# Patient Record
Sex: MALE | Race: WHITE | Employment: OTHER | URBAN - METROPOLITAN AREA
[De-identification: names, ages, dates, MRNs, and addresses within clinical notes are randomized per-mention and may not be internally consistent; named-entity substitution may affect disease eponyms.]

---

## 2017-03-20 ENCOUNTER — ANESTHESIA EVENT (OUTPATIENT)
Dept: ENDOSCOPY | Age: 82
End: 2017-03-20
Payer: MEDICARE

## 2017-03-21 ENCOUNTER — ANESTHESIA (OUTPATIENT)
Dept: ENDOSCOPY | Age: 82
End: 2017-03-21
Payer: MEDICARE

## 2017-03-21 PROCEDURE — 74011250636 HC RX REV CODE- 250/636

## 2017-03-21 RX ORDER — PROPOFOL 10 MG/ML
INJECTION, EMULSION INTRAVENOUS
Status: DISCONTINUED | OUTPATIENT
Start: 2017-03-21 | End: 2017-03-21 | Stop reason: HOSPADM

## 2017-03-21 RX ORDER — PROPOFOL 10 MG/ML
INJECTION, EMULSION INTRAVENOUS AS NEEDED
Status: DISCONTINUED | OUTPATIENT
Start: 2017-03-21 | End: 2017-03-21 | Stop reason: HOSPADM

## 2017-03-21 RX ADMIN — PROPOFOL 80 MG: 10 INJECTION, EMULSION INTRAVENOUS at 10:00

## 2017-03-21 RX ADMIN — PROPOFOL 100 MCG/KG/MIN: 10 INJECTION, EMULSION INTRAVENOUS at 10:00

## 2017-03-21 NOTE — ANESTHESIA POSTPROCEDURE EVALUATION
Post-Anesthesia Evaluation and Assessment    Patient: Neftaly Simeon MRN: [id number]  SSN: [id number]    YOB: 1927  Age: 80 y.o. Sex: male       Cardiovascular Function/Vital Signs  Visit Vitals    BP 95/52    Pulse 62    Temp 36 °C (96.8 °F)    Resp 12    Ht 5' 8\" (1.727 m)    Wt 70.8 kg (156 lb)    SpO2 96%    BMI 23.72 kg/m2       Patient is status post total IV anesthesia anesthesia for Procedure(s):  ESOPHAGOGASTRODUODENOSCOPY (EGD)    BMI 26  ESOPHAGEAL DILATION  INJECTION. Nausea/Vomiting: None    Postoperative hydration reviewed and adequate. Pain:  Pain Scale 1: Numeric (0 - 10) (03/21/17 1001)  Pain Intensity 1: 0 (03/21/17 1001)   Managed    Neurological Status: At baseline    Mental Status and Level of Consciousness: Arousable    Pulmonary Status:   O2 Device: CO2 nasal cannula (03/21/17 1001)   Adequate oxygenation and airway patent    Complications related to anesthesia: None    Post-anesthesia assessment completed.  No concerns    Signed By: Marilee Holland MD     March 21, 2017

## 2017-03-21 NOTE — ANESTHESIA PREPROCEDURE EVALUATION
Anesthetic History   No history of anesthetic complications            Review of Systems / Medical History  Patient summary reviewed and pertinent labs reviewed    Pulmonary  Within defined limits                 Neuro/Psych   Within defined limits           Cardiovascular  Within defined limits                Exercise tolerance: >4 METS     GI/Hepatic/Renal     GERD: well controlled           Endo/Other             Other Findings              Physical Exam    Airway  Mallampati: II  TM Distance: 4 - 6 cm  Neck ROM: normal range of motion   Mouth opening: Normal     Cardiovascular  Regular rate and rhythm,  S1 and S2 normal,  no murmur, click, rub, or gallop  Rhythm: regular  Rate: normal         Dental    Dentition: Full lower dentures and Full upper dentures     Pulmonary  Breath sounds clear to auscultation               Abdominal  GI exam deferred       Other Findings            Anesthetic Plan    ASA: 2  Anesthesia type: total IV anesthesia          Induction: Intravenous  Anesthetic plan and risks discussed with: Patient

## 2017-07-05 ENCOUNTER — ANESTHESIA EVENT (OUTPATIENT)
Dept: ENDOSCOPY | Age: 82
End: 2017-07-05
Payer: MEDICARE

## 2017-07-05 RX ORDER — SODIUM CHLORIDE, SODIUM LACTATE, POTASSIUM CHLORIDE, CALCIUM CHLORIDE 600; 310; 30; 20 MG/100ML; MG/100ML; MG/100ML; MG/100ML
100 INJECTION, SOLUTION INTRAVENOUS CONTINUOUS
Status: CANCELLED | OUTPATIENT
Start: 2017-07-05

## 2017-07-05 RX ORDER — SODIUM CHLORIDE 0.9 % (FLUSH) 0.9 %
5-10 SYRINGE (ML) INJECTION AS NEEDED
Status: CANCELLED | OUTPATIENT
Start: 2017-07-05

## 2017-07-06 ENCOUNTER — ANESTHESIA (OUTPATIENT)
Dept: ENDOSCOPY | Age: 82
End: 2017-07-06
Payer: MEDICARE

## 2017-07-06 ENCOUNTER — HOSPITAL ENCOUNTER (OUTPATIENT)
Age: 82
Setting detail: OUTPATIENT SURGERY
Discharge: HOME | End: 2017-07-06
Attending: INTERNAL MEDICINE | Admitting: INTERNAL MEDICINE
Payer: MEDICARE

## 2017-07-06 VITALS
DIASTOLIC BLOOD PRESSURE: 69 MMHG | RESPIRATION RATE: 16 BRPM | HEART RATE: 55 BPM | BODY MASS INDEX: 24.33 KG/M2 | OXYGEN SATURATION: 95 % | TEMPERATURE: 98.6 F | WEIGHT: 155 LBS | SYSTOLIC BLOOD PRESSURE: 125 MMHG | HEIGHT: 67 IN

## 2017-07-06 PROCEDURE — 77030009426 HC FCPS BIOP ENDOSC BSC -B: Performed by: INTERNAL MEDICINE

## 2017-07-06 PROCEDURE — 74011000250 HC RX REV CODE- 250

## 2017-07-06 PROCEDURE — C1726 CATH, BAL DIL, NON-VASCULAR: HCPCS | Performed by: INTERNAL MEDICINE

## 2017-07-06 PROCEDURE — 76060000031 HC ANESTHESIA FIRST 0.5 HR: Performed by: INTERNAL MEDICINE

## 2017-07-06 PROCEDURE — 77030031722: Performed by: INTERNAL MEDICINE

## 2017-07-06 PROCEDURE — 74011250636 HC RX REV CODE- 250/636: Performed by: INTERNAL MEDICINE

## 2017-07-06 PROCEDURE — 74011250636 HC RX REV CODE- 250/636: Performed by: ANESTHESIOLOGY

## 2017-07-06 PROCEDURE — 76040000025: Performed by: INTERNAL MEDICINE

## 2017-07-06 PROCEDURE — 74011250636 HC RX REV CODE- 250/636

## 2017-07-06 RX ORDER — SODIUM CHLORIDE, SODIUM LACTATE, POTASSIUM CHLORIDE, CALCIUM CHLORIDE 600; 310; 30; 20 MG/100ML; MG/100ML; MG/100ML; MG/100ML
1000 INJECTION, SOLUTION INTRAVENOUS CONTINUOUS
Status: DISCONTINUED | OUTPATIENT
Start: 2017-07-06 | End: 2017-07-06 | Stop reason: HOSPADM

## 2017-07-06 RX ORDER — SODIUM CHLORIDE, SODIUM LACTATE, POTASSIUM CHLORIDE, CALCIUM CHLORIDE 600; 310; 30; 20 MG/100ML; MG/100ML; MG/100ML; MG/100ML
100 INJECTION, SOLUTION INTRAVENOUS CONTINUOUS
Status: DISCONTINUED | OUTPATIENT
Start: 2017-07-06 | End: 2017-07-06 | Stop reason: HOSPADM

## 2017-07-06 RX ORDER — TRIAMCINOLONE ACETONIDE 40 MG/ML
40 INJECTION, SUSPENSION INTRA-ARTICULAR; INTRAMUSCULAR
Status: COMPLETED | OUTPATIENT
Start: 2017-07-06 | End: 2017-07-06

## 2017-07-06 RX ORDER — LIDOCAINE HYDROCHLORIDE 20 MG/ML
INJECTION, SOLUTION EPIDURAL; INFILTRATION; INTRACAUDAL; PERINEURAL AS NEEDED
Status: DISCONTINUED | OUTPATIENT
Start: 2017-07-06 | End: 2017-07-06 | Stop reason: HOSPADM

## 2017-07-06 RX ORDER — PROPOFOL 10 MG/ML
INJECTION, EMULSION INTRAVENOUS AS NEEDED
Status: DISCONTINUED | OUTPATIENT
Start: 2017-07-06 | End: 2017-07-06 | Stop reason: HOSPADM

## 2017-07-06 RX ADMIN — PROPOFOL 20 MG: 10 INJECTION, EMULSION INTRAVENOUS at 09:03

## 2017-07-06 RX ADMIN — PROPOFOL 20 MG: 10 INJECTION, EMULSION INTRAVENOUS at 09:04

## 2017-07-06 RX ADMIN — PROPOFOL 20 MG: 10 INJECTION, EMULSION INTRAVENOUS at 09:07

## 2017-07-06 RX ADMIN — PROPOFOL 20 MG: 10 INJECTION, EMULSION INTRAVENOUS at 09:06

## 2017-07-06 RX ADMIN — PROPOFOL 20 MG: 10 INJECTION, EMULSION INTRAVENOUS at 09:05

## 2017-07-06 RX ADMIN — SODIUM CHLORIDE, SODIUM LACTATE, POTASSIUM CHLORIDE, AND CALCIUM CHLORIDE 100 ML/HR: 600; 310; 30; 20 INJECTION, SOLUTION INTRAVENOUS at 08:05

## 2017-07-06 RX ADMIN — TRIAMCINOLONE ACETONIDE 40 MG: 40 INJECTION, SUSPENSION INTRA-ARTICULAR; INTRAMUSCULAR at 09:13

## 2017-07-06 RX ADMIN — LIDOCAINE HYDROCHLORIDE 40 MG: 20 INJECTION, SOLUTION EPIDURAL; INFILTRATION; INTRACAUDAL; PERINEURAL at 09:03

## 2017-07-06 NOTE — ANESTHESIA POSTPROCEDURE EVALUATION
Post-Anesthesia Evaluation and Assessment    Patient: Nathalie Daniels MRN: [id number]  SSN: [id number]    YOB: 1927  Age: 80 y.o. Sex: male       Cardiovascular Function/Vital Signs  Visit Vitals    /69    Pulse (!) 55    Temp 37 °C (98.6 °F)    Resp 16    Ht 5' 7\" (1.702 m)    Wt 70.3 kg (155 lb)    SpO2 95%    BMI 24.28 kg/m2       Patient is status post total IV anesthesia anesthesia for Procedure(s):  ESOPHAGOGASTRODUODENOSCOPY (EGD) WITH DILATION WITH KENALOG/ BMI=25  ESOPHAGEAL DILATION  INJECTION. Nausea/Vomiting: None    Postoperative hydration reviewed and adequate. Pain:  Pain Scale 1: Numeric (0 - 10) (07/06/17 0936)  Pain Intensity 1: 0 (07/06/17 0936)   Managed    Neurological Status: At baseline    Mental Status and Level of Consciousness: Arousable    Pulmonary Status:   O2 Device: Room air (07/06/17 0936)   Adequate oxygenation and airway patent    Complications related to anesthesia: None    Post-anesthesia assessment completed.  No concerns    Signed By: Lior Watts MD     July 6, 2017

## 2017-07-06 NOTE — ANESTHESIA PREPROCEDURE EVALUATION
Anesthetic History               Review of Systems / Medical History  Patient summary reviewed, nursing notes reviewed and pertinent labs reviewed    Pulmonary                   Neuro/Psych             Comments: [place] [facility]                  Exercise tolerance: >4 METS     GI/Hepatic/Renal     GERD: well controlled          Comments: Esophageal stenosis Endo/Other             Other Findings            Physical Exam    Airway  Mallampati: II  TM Distance: > 6 cm  Neck ROM: decreased range of motion   Mouth opening: Normal     Cardiovascular  Regular rate and rhythm,  S1 and S2 normal,  no murmur, click, rub, or gallop             Dental    Dentition: Lower partial plate and Bridges     Pulmonary        Wheezes (Bilaterally.   Mild)         Abdominal  GI exam deferred       Other Findings            Anesthetic Plan    ASA: 3  Anesthesia type: total IV anesthesia            Anesthetic plan and risks discussed with: Patient    Grandson present

## 2017-07-06 NOTE — IP AVS SNAPSHOT
[address] 
[phone] Patient: Boston Corrales MRN: [id number] :1927 You are allergic to the following Allergen Reactions Chocolate Flavor (Flavoring Agent) Rash Recent Documentation Height Weight BMI Smoking Status 1.702 m 70.3 kg 24.28 kg/m2 Never Smoker Emergency Contacts Name Discharge Info Relation Home Work Mobile Hayley,Josh(Grandson) DISCHARGE CAREGIVER [3] Other Relative [6] [phone] About your hospitalization You were admitted on:  2017 You last received care in the:  [facility] You were discharged on:  2017 Unit phone number:  [phone] Why you were hospitalized Your primary diagnosis was:  Not on File Providers Seen During Your Hospitalizations Provider Role Specialty Primary office phone Kala Jennings MD Attending Provider Gastroenterology [phone] Your Primary Care Physician (PCP) Primary Care Physician Office Phone Office Fax Tc Owens [phone] Follow-up Information None Current Discharge Medication List  
  
ASK your doctor about these medications Dose & Instructions Dispensing Information Comments Morning Noon Evening Bedtime  
 doxazosin 4 mg tablet Commonly known as:  CARDURA Your last dose was: Your next dose is:    
   
   
 Dose:  4 mg Take 4 mg by mouth nightly. Indications: BENIGN PROSTATIC HYPERTROPHY Refills:  0  
     
   
   
   
  
 ZANTAC 150 mg tablet Generic drug:  raNITIdine Your last dose was: Your next dose is:    
   
   
 Dose:  150 mg Take 150 mg by mouth nightly. Refills:  0 Discharge Instructions Gastrointestinal Esophagogastroduodenoscopy (EGD) - Upper Exam Discharge Instructions 1. Call Dr. Ofelia Maguire for any problems or questions. 2. Contact the doctor's office for follow up appointment as directed. 3. Medication may cause drowsiness for several hours, therefore, do not drive or operate machinery for remainder of the day. 4. No alcohol today. 5. Ordinarily, you may resume regular diet and activity after exam unless otherwise specified by your physician. 6. For mild soreness in your throat you may use Cepacol throat lozenges or warm salt-water gargles as needed. Any additional instructions:   
 
EGD/balloon dilation/Kenalog-40 injection in 4 months. 
  
  
Instructions given to Meena Anglin and other family members. Instructions given by:  Skyla Bates RN Discharge Orders None Introducing [facility]! Walker Aggarwal introduces [employer] patient portal. Now you can access parts of your medical record, email your doctor's office, and request medication refills online. 1. In your internet browser, go to [email or web address] 2. Click on the First Time User? Click Here link in the Sign In box. You will see the New Member Sign Up page. 3. Enter your [employer] Access Code exactly as it appears below. You will not need to use this code after youve completed the sign-up process. If you do not sign up before the expiration date, you must request a new code. · [employer] Access Code: [email or web address] Expires: 9/27/2017  2:27 PM 
 
4. Enter the last four digits of your Social Security Number (xxxx) and Date of Birth (mm/dd/yyyy) as indicated and click Submit. You will be taken to the next sign-up page. 5. Create a [employer] ID. This will be your [employer] login ID and cannot be changed, so think of one that is secure and easy to remember. 6. Create a [employer] password. You can change your password at any time. 7. Enter your Password Reset Question and Answer. This can be used at a later time if you forget your password. 8. Enter your e-mail address. You will receive e-mail notification when new information is available in [address] 9. Click Sign Up. You can now view and download portions of your medical record. 10. Click the Download Summary menu link to download a portable copy of your medical information. If you have questions, please visit the Frequently Asked Questions section of the [employer] website. Remember, [employer] is NOT to be used for urgent needs. For medical emergencies, dial 911. Now available from your iPhone and Android! General Information Please provide this summary of care documentation to your next provider. Patient Signature:  ____________________________________________________________ Date:  ____________________________________________________________  
  
[facility] Provider Signature:  ____________________________________________________________ Date:  ____________________________________________________________

## 2017-07-06 NOTE — DISCHARGE INSTRUCTIONS
Gastrointestinal Esophagogastroduodenoscopy (EGD) - Upper Exam Discharge Instructions    1. Call Dr. Giovanna Resendiz for any problems or questions. 2. Contact the doctor's office for follow up appointment as directed. 3. Medication may cause drowsiness for several hours, therefore, do not drive or operate machinery for remainder of the day. 4. No alcohol today. 5. Ordinarily, you may resume regular diet and activity after exam unless otherwise specified by your physician. 6. For mild soreness in your throat you may use Cepacol throat lozenges or warm salt-water gargles as needed. Any additional instructions:      EGD/balloon dilation/Kenalog-40 injection in 4 months.        Instructions given to Gen Henry and other family members.   Instructions given by:  Mikayla Montana RN

## 2017-07-06 NOTE — PROGRESS NOTES
Discharge instructions and discharge med list given to pt's grandson, Christina Ward, voiced understanding.

## 2017-07-06 NOTE — H&P
PreProcedure H&P Update    Today's Date:  7/6/2017    CC:  dysphagia    Subjective:   HPI: dysphagia    PMH:  Past Medical History:   Diagnosis Date    Constipation     occassional, manage with OTC PRN med    Esophageal stenosis     GERD (gastroesophageal reflux disease)     controlled with med    [place] (hard of hearing)     pt doesnt wear any hearing aids    Ill-defined condition     \"burning in feet\" per pt; occasional/episodic.- Treated at [facility] in [place], getting injection    Pneumonia 12/2013    hx of X1        Medications:   Current Facility-Administered Medications   Medication Dose Route Frequency    lactated Ringers infusion  100 mL/hr IntraVENous CONTINUOUS    lactated Ringers infusion 1,000 mL  1,000 mL IntraVENous CONTINUOUS    triamcinolone acetonide (KENALOG-40) 40 mg/mL injection 40 mg  40 mg Other NOW         Objective:   Vitals:  Visit Vitals    /70    Pulse (!) 59    Temp 98.1 °F (36.7 °C)    Resp 18    Ht 5' 7\" (1.702 m)    Wt 70.3 kg (155 lb)    SpO2 94%    BMI 24.28 kg/m2     Exam:  General appearance: alert, cooperative, no distress  Lungs: clear to auscultation bilaterally anteriorly  Heart: regular rate and rhythm  Abdomen: soft, non-tender. Bowel sounds normal. No masses, no organomegaly      Data Review (Labs):    No results for input(s): WBC, HGB, HCT, PLT, MCV, NA, K, CL, CO2, BUN, CREA, CA, GLU, AP, SGOT, GPT, TBIL, CBIL, ALB, TP, AML, LPSE, PTP, INR, APTT, HGBEXT, HCTEXT, PLTEXT in the last 72 hours. No lab exists for component: DBIL, INREXT    Plan:     Dysphagia with know esophageal stricture. Proceed with EGD dilation and Kenalog injection.

## 2017-07-06 NOTE — PROCEDURES
Esophagogastroduodenoscopy    DATE of PROCEDURE: 7/6/2017    INDICATION: esophageal stricture    POSTPROCEDURE DIAGNOSIS: esophageal stricture    MEDICATIONS ADMINISTERED: MAC anesthesia (see anesthesia report)    INSTRUMENT:    PROCEDURE:  After obtaining informed consent, the patient was placed in the left lateral position and sedated. The endoscope was advanced under direct vision without difficulty. The esophagus, stomach (including retroflexed views) and duodenum were evaluated. The patient was taken to the recovery area in stable condition. FINDINGS:  ESOPHAGUS: tight benign distal esophageal stricture, mild resistance to passage of adult EGD scope; balloon dilated to 12 mm with resultant moderate 1.5 cm long distal tear. Then injected in 0.5 cc aliquots, total of 3 cc of Kenalog-40. Then cold biopsied GE junction to help open up the stricture some more. STOMACH: antral gastritis, no ulcers. DUODENUM: mild duodenitis    Estimated blood loss: 0-minimal   Specimens obtained during procedure: none    PLAN: EGD/balloon dilation/Kenalog-40 injection in 4 months.

## 2017-10-02 ENCOUNTER — ANESTHESIA EVENT (OUTPATIENT)
Dept: ENDOSCOPY | Age: 82
End: 2017-10-02
Payer: MEDICARE

## 2017-10-02 RX ORDER — SODIUM CHLORIDE, SODIUM LACTATE, POTASSIUM CHLORIDE, CALCIUM CHLORIDE 600; 310; 30; 20 MG/100ML; MG/100ML; MG/100ML; MG/100ML
100 INJECTION, SOLUTION INTRAVENOUS CONTINUOUS
Status: CANCELLED | OUTPATIENT
Start: 2017-10-02

## 2017-10-02 RX ORDER — SODIUM CHLORIDE 0.9 % (FLUSH) 0.9 %
5-10 SYRINGE (ML) INJECTION AS NEEDED
Status: CANCELLED | OUTPATIENT
Start: 2017-10-02

## 2017-10-03 ENCOUNTER — ANESTHESIA (OUTPATIENT)
Dept: ENDOSCOPY | Age: 82
End: 2017-10-03
Payer: MEDICARE

## 2017-10-03 ENCOUNTER — HOSPITAL ENCOUNTER (OUTPATIENT)
Age: 82
Setting detail: OUTPATIENT SURGERY
Discharge: HOME | End: 2017-10-03
Attending: INTERNAL MEDICINE | Admitting: INTERNAL MEDICINE
Payer: MEDICARE

## 2017-10-03 VITALS
SYSTOLIC BLOOD PRESSURE: 128 MMHG | WEIGHT: 160 LBS | HEART RATE: 63 BPM | RESPIRATION RATE: 16 BRPM | DIASTOLIC BLOOD PRESSURE: 67 MMHG | OXYGEN SATURATION: 97 % | TEMPERATURE: 96.8 F | BODY MASS INDEX: 24.25 KG/M2 | HEIGHT: 68 IN

## 2017-10-03 PROCEDURE — 76040000025: Performed by: INTERNAL MEDICINE

## 2017-10-03 PROCEDURE — 77030009426 HC FCPS BIOP ENDOSC BSC -B: Performed by: INTERNAL MEDICINE

## 2017-10-03 PROCEDURE — 74011250636 HC RX REV CODE- 250/636: Performed by: INTERNAL MEDICINE

## 2017-10-03 PROCEDURE — 77030031722: Performed by: INTERNAL MEDICINE

## 2017-10-03 PROCEDURE — C1726 CATH, BAL DIL, NON-VASCULAR: HCPCS | Performed by: INTERNAL MEDICINE

## 2017-10-03 PROCEDURE — 74011250636 HC RX REV CODE- 250/636

## 2017-10-03 PROCEDURE — 74011250636 HC RX REV CODE- 250/636: Performed by: ANESTHESIOLOGY

## 2017-10-03 PROCEDURE — 74011000250 HC RX REV CODE- 250

## 2017-10-03 PROCEDURE — 76060000032 HC ANESTHESIA 0.5 TO 1 HR: Performed by: INTERNAL MEDICINE

## 2017-10-03 RX ORDER — TRIAMCINOLONE ACETONIDE 40 MG/ML
40 INJECTION, SUSPENSION INTRA-ARTICULAR; INTRAMUSCULAR
Status: DISCONTINUED | OUTPATIENT
Start: 2017-10-03 | End: 2017-10-03 | Stop reason: HOSPADM

## 2017-10-03 RX ORDER — LIDOCAINE HYDROCHLORIDE 20 MG/ML
INJECTION, SOLUTION EPIDURAL; INFILTRATION; INTRACAUDAL; PERINEURAL AS NEEDED
Status: DISCONTINUED | OUTPATIENT
Start: 2017-10-03 | End: 2017-10-03 | Stop reason: HOSPADM

## 2017-10-03 RX ORDER — PROPOFOL 10 MG/ML
INJECTION, EMULSION INTRAVENOUS AS NEEDED
Status: DISCONTINUED | OUTPATIENT
Start: 2017-10-03 | End: 2017-10-03 | Stop reason: HOSPADM

## 2017-10-03 RX ORDER — SODIUM CHLORIDE, SODIUM LACTATE, POTASSIUM CHLORIDE, CALCIUM CHLORIDE 600; 310; 30; 20 MG/100ML; MG/100ML; MG/100ML; MG/100ML
100 INJECTION, SOLUTION INTRAVENOUS CONTINUOUS
Status: DISCONTINUED | OUTPATIENT
Start: 2017-10-03 | End: 2017-10-03 | Stop reason: HOSPADM

## 2017-10-03 RX ADMIN — PROPOFOL 20 MG: 10 INJECTION, EMULSION INTRAVENOUS at 08:54

## 2017-10-03 RX ADMIN — PROPOFOL 20 MG: 10 INJECTION, EMULSION INTRAVENOUS at 09:00

## 2017-10-03 RX ADMIN — TRIAMCINOLONE ACETONIDE 40 MG: 40 INJECTION, SUSPENSION INTRA-ARTICULAR; INTRAMUSCULAR at 09:03

## 2017-10-03 RX ADMIN — LIDOCAINE HYDROCHLORIDE 60 MG: 20 INJECTION, SOLUTION EPIDURAL; INFILTRATION; INTRACAUDAL; PERINEURAL at 08:51

## 2017-10-03 RX ADMIN — PROPOFOL 20 MG: 10 INJECTION, EMULSION INTRAVENOUS at 08:51

## 2017-10-03 RX ADMIN — PROPOFOL 20 MG: 10 INJECTION, EMULSION INTRAVENOUS at 08:57

## 2017-10-03 RX ADMIN — PROPOFOL 20 MG: 10 INJECTION, EMULSION INTRAVENOUS at 09:06

## 2017-10-03 RX ADMIN — SODIUM CHLORIDE, SODIUM LACTATE, POTASSIUM CHLORIDE, AND CALCIUM CHLORIDE 100 ML/HR: 600; 310; 30; 20 INJECTION, SOLUTION INTRAVENOUS at 08:02

## 2017-10-03 RX ADMIN — PROPOFOL 20 MG: 10 INJECTION, EMULSION INTRAVENOUS at 09:03

## 2017-10-03 NOTE — PROCEDURES
Esophagogastroduodenoscopy    DATE of PROCEDURE: 10/3/2017    INDICATION: known tight esophageal stricture    POSTPROCEDURE DIAGNOSIS: esophageal stricture    MEDICATIONS ADMINISTERED: MAC anesthesia (see anesthesia report)    INSTRUMENT:    PROCEDURE:  After obtaining informed consent, the patient was placed in the left lateral position and sedated. The endoscope was advanced under direct vision without difficulty. The esophagus, stomach (including retroflexed views) and duodenum were evaluated. The patient was taken to the recovery area in stable condition. FINDINGS:  ESOPHAGUS: benign stricture at GE junction with mucosal disruption with passage of EGD scope; after balloon dilation very slowly to 12 mm diameter, achieved a separate and small mucosal tear. Injected in 4 aliquots of 0.5 cc Kenalog-40, for total of 2 cc. Then used forceps to cold biopsy the GE junction to open it up further  STOMACH: mild diffuse nonspecific gastritis  DUODENUM: normal    Estimated blood loss: 0-minimal   Specimens obtained during procedure: none    PLAN: EGD/balloon dilation with Kenalog-40 in 2 months.

## 2017-10-03 NOTE — H&P
PreProcedure H&P Update    Today's Date:  10/3/2017    CC:  dysphagia    Subjective:   HPI: known esophageal stricture    PMH:  Past Medical History:   Diagnosis Date    Constipation     occassional, manage with OTC PRN med    Esophageal stenosis     GERD (gastroesophageal reflux disease)     controlled with med    [place] (hard of hearing)     pt doesnt wear any hearing aids    Ill-defined condition     \"burning in feet\" per pt; occasional/episodic.- Treated at [facility] in [place], getting injection    Pneumonia 12/2013    hx of X1        Medications:   Current Facility-Administered Medications   Medication Dose Route Frequency    lactated Ringers infusion  100 mL/hr IntraVENous CONTINUOUS         Objective:   Vitals:  Visit Vitals    /67    Pulse 66    Temp 97.9 °F (36.6 °C)    Resp 18    Ht 5' 8\" (1.727 m)    Wt 72.6 kg (160 lb)    SpO2 94%    BMI 24.33 kg/m2     Exam:  General appearance: alert, cooperative, no distress  Lungs: clear to auscultation bilaterally anteriorly  Heart: regular rate and rhythm  Abdomen: soft, non-tender. Bowel sounds normal. No masses, no organomegaly      Data Review (Labs):    No results for input(s): WBC, HGB, HCT, PLT, MCV, NA, K, CL, CO2, BUN, CREA, CA, GLU, AP, SGOT, GPT, TBIL, CBIL, ALB, TP, AML, LPSE, PTP, INR, APTT, HGBEXT, HCTEXT, PLTEXT in the last 72 hours. No lab exists for component: DBIL, INREXT    Plan:     Known esophageal stricture. Here for EGD, dilation, Kenalog injection.

## 2017-10-03 NOTE — ROUTINE PROCESS
I have reviewed discharge instructions with the patient and son. The patient and son verbalized understanding. VSS stable, Patient Alert.

## 2017-10-03 NOTE — DISCHARGE INSTRUCTIONS
Gastrointestinal Esophagogastroduodenoscopy (EGD) - Upper Exam Discharge Instructions    1. Call Dr. Marko Brooks at [phone] for any problems or questions. 2. Contact the doctor's office for follow up appointment as directed. 3. Medication may cause drowsiness for several hours, therefore, do not drive or operate machinery for remainder of the day. 4. No alcohol today. 5. Ordinarily, you may resume regular diet and activity after exam unless otherwise specified by your physician. 6. For mild soreness in your throat you may use Cepacol throat lozenges or warm salt-water gargles as needed. Any additional instructions:  Soft Diet the rest of the day. Instructions given to  Aliantonino and other family members.

## 2017-10-03 NOTE — IP AVS SNAPSHOT
[address] 
[phone] Patient: Beth Conner MRN: [id number] :1927 You are allergic to the following Allergen Reactions Chocolate Flavor (Flavoring Agent) Rash Recent Documentation Height Weight BMI Smoking Status 1.727 m 72.6 kg 24.33 kg/m2 Never Smoker Emergency Contacts Name Discharge Info Relation Home Work Mobile Remi Hardy(Grandson) DISCHARGE CAREGIVER [3] Other Relative [6] [phone] About your hospitalization You were admitted on:  October 3, 2017 You last received care in the:  [facility] You were discharged on:  October 3, 2017 Unit phone number:  [phone] Why you were hospitalized Your primary diagnosis was:  Not on File Providers Seen During Your Hospitalizations Provider Role Specialty Primary office phone Ángela Carranza MD Attending Provider Gastroenterology [phone] Your Primary Care Physician (PCP) Primary Care Physician Office Phone Office Fax Jeff Paci [phone] Follow-up Information None Current Discharge Medication List  
  
ASK your doctor about these medications Dose & Instructions Dispensing Information Comments Morning Noon Evening Bedtime  
 doxazosin 4 mg tablet Commonly known as:  CARDURA Your last dose was: Your next dose is:    
   
   
 Dose:  4 mg Take 4 mg by mouth nightly. Indications: BENIGN PROSTATIC HYPERTROPHY Refills:  0  
     
   
   
   
  
 ZANTAC 150 mg tablet Generic drug:  raNITIdine Your last dose was: Your next dose is:    
   
   
 Dose:  150 mg Take 150 mg by mouth nightly. Refills:  0 Discharge Instructions Gastrointestinal Esophagogastroduodenoscopy (EGD) - Upper Exam Discharge Instructions 1. Call Dr. Chato Mcdonnell at [phone] for any problems or questions. 2. Contact the doctor's office for follow up appointment as directed. 3. Medication may cause drowsiness for several hours, therefore, do not drive or operate machinery for remainder of the day. 4. No alcohol today. 5. Ordinarily, you may resume regular diet and activity after exam unless otherwise specified by your physician. 6. For mild soreness in your throat you may use Cepacol throat lozenges or warm salt-water gargles as needed. Any additional instructions:  Soft Diet the rest of the day. Instructions given to Olivia Malhotra and other family members. Discharge Orders None Introducing [facility]! Seth Moreira introduces [employer] patient portal. Now you can access parts of your medical record, email your doctor's office, and request medication refills online. 1. In your internet browser, go to [email or web address] 2. Click on the First Time User? Click Here link in the Sign In box. You will see the New Member Sign Up page. 3. Enter your [employer] Access Code exactly as it appears below. You will not need to use this code after youve completed the sign-up process. If you do not sign up before the expiration date, you must request a new code. · [employer] Access Code: [email or web address] Expires: 12/31/2017  3:10 PM 
 
4. Enter the last four digits of your Social Security Number (xxxx) and Date of Birth (mm/dd/yyyy) as indicated and click Submit. You will be taken to the next sign-up page. 5. Create a [employer] ID. This will be your [employer] login ID and cannot be changed, so think of one that is secure and easy to remember. 6. Create a [employer] password. You can change your password at any time. 7. Enter your Password Reset Question and Answer. This can be used at a later time if you forget your password. 8. Enter your e-mail address.  You will receive e-mail notification when new information is available in [employer]. 9. Click Sign Up. You can now view and download portions of your medical record. 10. Click the Download Summary menu link to download a portable copy of your medical information. If you have questions, please visit the Frequently Asked Questions section of the [employer] website. Remember, [employer] is NOT to be used for urgent needs. For medical emergencies, dial 911. Now available from your iPhone and Android! General Information Please provide this summary of care documentation to your next provider. Patient Signature:  ____________________________________________________________ Date:  ____________________________________________________________  
  
[facility] Provider Signature:  ____________________________________________________________ Date:  ____________________________________________________________

## 2017-10-03 NOTE — ANESTHESIA POSTPROCEDURE EVALUATION
Post-Anesthesia Evaluation and Assessment    Patient: Courtney Koch MRN: [id number]  SSN: [id number]    YOB: 1927  Age: 80 y.o. Sex: male       Cardiovascular Function/Vital Signs  Visit Vitals    /60    Pulse 62    Temp 36 °C (96.8 °F)    Resp 16    Ht 5' 8\" (1.727 m)    Wt 72.6 kg (160 lb)    SpO2 97%    BMI 24.33 kg/m2       Patient is status post total IV anesthesia anesthesia for Procedure(s):  ESOPHAGOGASTRODUODENOSCOPY (EGD)  BMI 25  ESOPHAGEAL DILATION  INJECTION  ESOPHAGOGASTRODUODENAL (EGD) BIOPSY. Nausea/Vomiting: None    Postoperative hydration reviewed and adequate. Pain:  Pain Scale 1: Numeric (0 - 10) (10/03/17 0937)  Pain Intensity 1: 0 (10/03/17 9427)   Managed    Neurological Status: At baseline    Mental Status and Level of Consciousness: Arousable    Pulmonary Status:   O2 Device: Nasal cannula (10/03/17 0937)   Adequate oxygenation and airway patent    Complications related to anesthesia: None    Post-anesthesia assessment completed.  No concerns    Signed By: Sadia Long MD     October 3, 2017

## 2017-10-03 NOTE — ANESTHESIA PREPROCEDURE EVALUATION
Anesthetic History   No history of anesthetic complications            Review of Systems / Medical History  Patient summary reviewed and pertinent labs reviewed    Pulmonary  Within defined limits                 Neuro/Psych   Within defined limits           Cardiovascular                Pertinent negatives: No CAD  Exercise tolerance: >4 METS     GI/Hepatic/Renal     GERD: well controlled           Endo/Other  Within defined limits           Other Findings   Comments: Esophageal stenosis  presbycusis           Physical Exam    Airway  Mallampati: I  TM Distance: 4 - 6 cm  Neck ROM: normal range of motion   Mouth opening: Normal     Cardiovascular  Regular rate and rhythm,  S1 and S2 normal,  no murmur, click, rub, or gallop  Rhythm: regular  Rate: normal         Dental    Dentition: Full lower dentures     Pulmonary  Breath sounds clear to auscultation               Abdominal  GI exam deferred       Other Findings            Anesthetic Plan    ASA: 3  Anesthesia type: total IV anesthesia          Induction: Intravenous  Anesthetic plan and risks discussed with: Patient

## 2017-12-29 ENCOUNTER — ANESTHESIA EVENT (OUTPATIENT)
Dept: ENDOSCOPY | Age: 82
End: 2017-12-29
Payer: MEDICARE

## 2017-12-29 ENCOUNTER — HOSPITAL ENCOUNTER (OUTPATIENT)
Age: 82
Setting detail: OUTPATIENT SURGERY
Discharge: HOME | End: 2017-12-29
Attending: INTERNAL MEDICINE | Admitting: INTERNAL MEDICINE
Payer: MEDICARE

## 2017-12-29 ENCOUNTER — ANESTHESIA (OUTPATIENT)
Dept: ENDOSCOPY | Age: 82
End: 2017-12-29
Payer: MEDICARE

## 2017-12-29 VITALS
HEART RATE: 65 BPM | OXYGEN SATURATION: 95 % | WEIGHT: 155 LBS | SYSTOLIC BLOOD PRESSURE: 145 MMHG | RESPIRATION RATE: 16 BRPM | DIASTOLIC BLOOD PRESSURE: 69 MMHG | TEMPERATURE: 97.7 F | HEIGHT: 67 IN | BODY MASS INDEX: 24.33 KG/M2

## 2017-12-29 PROCEDURE — 76060000031 HC ANESTHESIA FIRST 0.5 HR: Performed by: INTERNAL MEDICINE

## 2017-12-29 PROCEDURE — 76040000025: Performed by: INTERNAL MEDICINE

## 2017-12-29 PROCEDURE — 74011000250 HC RX REV CODE- 250

## 2017-12-29 PROCEDURE — 74011250636 HC RX REV CODE- 250/636: Performed by: ANESTHESIOLOGY

## 2017-12-29 PROCEDURE — 74011250636 HC RX REV CODE- 250/636

## 2017-12-29 RX ORDER — SODIUM CHLORIDE, SODIUM LACTATE, POTASSIUM CHLORIDE, CALCIUM CHLORIDE 600; 310; 30; 20 MG/100ML; MG/100ML; MG/100ML; MG/100ML
1000 INJECTION, SOLUTION INTRAVENOUS CONTINUOUS
Status: DISCONTINUED | OUTPATIENT
Start: 2017-12-29 | End: 2017-12-29 | Stop reason: HOSPADM

## 2017-12-29 RX ORDER — PROPOFOL 10 MG/ML
INJECTION, EMULSION INTRAVENOUS
Status: DISCONTINUED | OUTPATIENT
Start: 2017-12-29 | End: 2017-12-29 | Stop reason: HOSPADM

## 2017-12-29 RX ORDER — LIDOCAINE HYDROCHLORIDE 20 MG/ML
INJECTION, SOLUTION EPIDURAL; INFILTRATION; INTRACAUDAL; PERINEURAL AS NEEDED
Status: DISCONTINUED | OUTPATIENT
Start: 2017-12-29 | End: 2017-12-29 | Stop reason: HOSPADM

## 2017-12-29 RX ORDER — TRIAMCINOLONE ACETONIDE 40 MG/ML
40 INJECTION, SUSPENSION INTRA-ARTICULAR; INTRAMUSCULAR ONCE
Status: DISCONTINUED | OUTPATIENT
Start: 2017-12-29 | End: 2017-12-29

## 2017-12-29 RX ORDER — PROPOFOL 10 MG/ML
INJECTION, EMULSION INTRAVENOUS AS NEEDED
Status: DISCONTINUED | OUTPATIENT
Start: 2017-12-29 | End: 2017-12-29 | Stop reason: HOSPADM

## 2017-12-29 RX ADMIN — PROPOFOL 100 MCG/KG/MIN: 10 INJECTION, EMULSION INTRAVENOUS at 09:11

## 2017-12-29 RX ADMIN — PROPOFOL 50 MG: 10 INJECTION, EMULSION INTRAVENOUS at 09:11

## 2017-12-29 RX ADMIN — LIDOCAINE HYDROCHLORIDE 40 MG: 20 INJECTION, SOLUTION EPIDURAL; INFILTRATION; INTRACAUDAL; PERINEURAL at 09:10

## 2017-12-29 RX ADMIN — SODIUM CHLORIDE, SODIUM LACTATE, POTASSIUM CHLORIDE, AND CALCIUM CHLORIDE 1000 ML: 600; 310; 30; 20 INJECTION, SOLUTION INTRAVENOUS at 08:47

## 2017-12-29 NOTE — IP AVS SNAPSHOT
[address] 
[phone] Patient: Neo Jaimes MRN: [id number] :[id number] About your hospitalization You were admitted on:  2017 You last received care in the:  [facility] You were discharged on:  2017 Why you were hospitalized Your primary diagnosis was:  Not on File Discharge Orders None A check jas indicates which time of day the medication should be taken. My Medications ASK your physician about these medications Instructions Each Dose to Equal  
 Morning Noon Evening Bedtime  
 doxazosin 4 mg tablet Commonly known as:  CARDURA Your last dose was: Your next dose is: Take 4 mg by mouth nightly. Indications: BENIGN PROSTATIC HYPERTROPHY 4 mg ZANTAC 150 mg tablet Generic drug:  raNITIdine Your last dose was: Your next dose is: Take 150 mg by mouth nightly. 150 mg Discharge Instructions Gastrointestinal Esophagogastroduodenoscopy (EGD) - Upper Exam Discharge Instructions 1. Call Dr. Khalida Berg at [phone] for any problems or questions. 2. Contact the doctor's office for follow up appointment as directed. 3. Medication may cause drowsiness for several hours, therefore, do not drive or operate machinery for remainder of the day. 4. No alcohol today. 5. Ordinarily, you may resume regular diet and activity after exam unless otherwise specified by your physician. 6. For mild soreness in your throat you may use Cepacol throat lozenges or warm salt-water gargles as needed. Any additional instructions:   
 
1. Begin acid reducing medication twice a day 2. Suggest serial dilation at decreased interval of about 2-3 weeks in order to enlarge the stricture. Will discuss with Dr. Awilda Agarwal. Instructions given to Genna Gordillo and other family members. Instructions given by:  Tammy Grier RN Introducing [facility]! Noa Villalobos introduces [employer] patient portal. Now you can access parts of your medical record, email your doctor's office, and request medication refills online. 1. In your internet browser, go to [email or web address] 2. Click on the First Time User? Click Here link in the Sign In box. You will see the New Member Sign Up page. 3. Enter your Heroku Access Code exactly as it appears below. You will not need to use this code after youve completed the sign-up process. If you do not sign up before the expiration date, you must request a new code. · Her[employer] Access Code: [email or web address] Expires: 12/31/2017  2:10 PM 
 
4. Enter the last four digits of your Social Security Number (xxxx) and Date of Birth (mm/dd/yyyy) as indicated and click Submit. You will be taken to the next sign-up page. 5. Create a Heroku ID. This will be your Heroku login ID and cannot be changed, so think of one that is secure and easy to remember. 6. Create a Heroku password. You can change your password at any time. 7. Enter your Password Reset Question and Answer. This can be used at a later time if you forget your password. 8. Enter your e-mail address. You will receive e-mail notification when new information is available in [address] 9. Click Sign Up. You can now view and download portions of your medical record. 10. Click the Download Summary menu link to download a portable copy of your medical information. If you have questions, please visit the Frequently Asked Questions section of the Her[employer] website. Remember, Heroku is NOT to be used for urgent needs. For medical emergencies, dial 911. Now available from your iPhone and Android! Providers Seen During Your Hospitalization Provider Specialty Primary office phone Caitie Amaya MD Gastroenterology [phone] Your Primary Care Physician (PCP) Primary Care Physician Office Phone Office Fax Edward Morales [phone] You are allergic to the following Allergen Reactions Chocolate Flavor (Flavoring Agent) Rash Recent Documentation Height Weight BMI Smoking Status 1.702 m 70.3 kg 24.28 kg/m2 Never Smoker Emergency Contacts Name Discharge Info Relation Home Work Mobile Remi Hardy(Grandson) DISCHARGE CAREGIVER [3] Other Relative [6] [phone] Patient Belongings The following personal items are in your possession at time of discharge: 
  Dental Appliances: Uppers, Lowers  Visual Aid: Glasses Please provide this summary of care documentation to your next provider. Signatures-by signing, you are acknowledging that this After Visit Summary has been reviewed with you and you have received a copy. Patient Signature:  ____________________________________________________________ Date:  ____________________________________________________________  
  
Serge Officer Provider Signature:  ____________________________________________________________ Date:  ____________________________________________________________

## 2017-12-29 NOTE — DISCHARGE INSTRUCTIONS
Gastrointestinal Esophagogastroduodenoscopy (EGD) - Upper Exam Discharge Instructions    1. Call Dr. Roberto Tomas at [phone] for any problems or questions. 2. Contact the doctor's office for follow up appointment as directed. 3. Medication may cause drowsiness for several hours, therefore, do not drive or operate machinery for remainder of the day. 4. No alcohol today. 5. Ordinarily, you may resume regular diet and activity after exam unless otherwise specified by your physician. 6. For mild soreness in your throat you may use Cepacol throat lozenges or warm salt-water gargles as needed. Any additional instructions:      1. Begin acid reducing medication twice a day  2. Suggest serial dilation at decreased interval of about 2-3 weeks in order to enlarge the stricture. Will discuss with Dr. Guillermo Chandra. Instructions given to Carleen Chaparro and other family members.   Instructions given by:  Enzo Garcia RN

## 2017-12-29 NOTE — ANESTHESIA PREPROCEDURE EVALUATION
Anesthetic History   No history of anesthetic complications            Review of Systems / Medical History  Patient summary reviewed and pertinent labs reviewed    Pulmonary  Within defined limits                 Neuro/Psych   Within defined limits           Cardiovascular                Pertinent negatives: No CAD  Exercise tolerance: >4 METS     GI/Hepatic/Renal     GERD: well controlled           Endo/Other  Within defined limits           Other Findings   Comments: Esophageal stenosis  presbycusis           Physical Exam    Airway  Mallampati: I  TM Distance: 4 - 6 cm  Neck ROM: normal range of motion   Mouth opening: Normal     Cardiovascular  Regular rate and rhythm,  S1 and S2 normal,  no murmur, click, rub, or gallop  Rhythm: regular  Rate: normal         Dental    Dentition: Full lower dentures     Pulmonary  Breath sounds clear to auscultation               Abdominal  GI exam deferred       Other Findings            Anesthetic Plan    ASA: 3  Anesthesia type: total IV anesthesia          Induction: Intravenous  Anesthetic plan and risks discussed with: Patient and Family

## 2017-12-29 NOTE — PROGRESS NOTES
Discharge instructions and discharge med list given to pt's son Vicki Kathleen, voiced understanding.

## 2017-12-29 NOTE — ANESTHESIA POSTPROCEDURE EVALUATION
Post-Anesthesia Evaluation and Assessment    Patient: Ansley Holley MRN: [id number]  SSN: [id number]    YOB: 1927  Age: 80 y.o. Sex: male       Cardiovascular Function/Vital Signs  Visit Vitals    /59    Pulse (!) 58    Temp 36.5 °C (97.7 °F)    Resp 12    Ht 5' 7\" (1.702 m)    Wt 70.3 kg (155 lb)    SpO2 98%    BMI 24.28 kg/m2       Patient is status post total IV anesthesia anesthesia for Procedure(s):  ESOPHAGOGASTRODUODENOSCOPY (EGD)    BMI =25. Nausea/Vomiting: None    Postoperative hydration reviewed and adequate. Pain:  Pain Scale 1: Visual (12/29/17 0919)  Pain Intensity 1: 0 (12/29/17 0919)   Managed    Neurological Status: At baseline    Mental Status and Level of Consciousness: Arousable    Pulmonary Status:   O2 Device: Nasal cannula (12/29/17 0919)   Adequate oxygenation and airway patent    Complications related to anesthesia: None    Post-anesthesia assessment completed.  No concerns    Signed By: Evaristo Roberts MD     December 29, 2017

## 2017-12-29 NOTE — PROCEDURES
Esophagogastroduodenoscopy    DATE of PROCEDURE: 12/29/2017    MEDICATIONS ADMINISTERED: MAC    INSTRUMENT: GIF    PROCEDURE:  After obtaining informed consent, the patient was placed in the left lateral position and sedated. The endoscope was advanced under direct vision without difficulty. The esophagus, stomach (including retroflexed views) and duodenum were evaluated. The patient was taken to the recovery area in stable condition. FINDINGS:  ESOPHAGUS: Esophageal stricture with multiple rings at the GE junction. The gastroscope passed easily, but on withdrawal there was about a 5 mm long by 3 mm wide mucosal break, so further dilation was not performed. STOMACH: normal  DUODENUM: normal. Normal transverse views of the papilla. Estimated blood loss: 0-minimal     PLAN:  1. Begin b.i.d. PPI  2. Suggest serial dilation at decreased interval of about 2-3 weeks in order to enlarge the stricture. Will discuss with Dr. Chela Manrique.     Amie Aguilar MD  [facility], [place]

## 2017-12-29 NOTE — H&P
[facility] H&P (Admission)        Date:  12/29/2017    Chief Complaint:  dysphagia    Subjective:     History of Present Illness:  Patient is a 80 y.o. being admitted for EGD with dilation. PMH:  Past Medical History:   Diagnosis Date    Constipation     occassional, manage with OTC PRN med    Esophageal stenosis     GERD (gastroesophageal reflux disease)     controlled with med    [place] (hard of hearing)     pt doesnt wear any hearing aids    Ill-defined condition     \"burning in feet\" per pt; occasional/episodic.- Treated at [facility] in [place], getting injection    Pneumonia 12/2013    hx of X1        PSH:  Past Surgical History:   Procedure Laterality Date    HX APPENDECTOMY  age 19's   Robel Martinis HX CATARACT REMOVAL Bilateral     HX COLONOSCOPY      HX OTHER SURGICAL  last one 11/2015 at [facility]    egd-- multi        Allergies: Allergies   Allergen Reactions    Chocolate Flavor [Flavoring Agent] Rash       Home Medications:  Prior to Admission medications    Medication Sig Start Date End Date Taking? Authorizing Provider   doxazosin (CARDURA) 4 mg tablet Take 4 mg by mouth nightly. Indications: BENIGN PROSTATIC HYPERTROPHY   Yes Historical Provider   ranitidine (ZANTAC) 150 mg tablet Take 150 mg by mouth nightly.     Historical Provider       Hospital Medications:  Current Facility-Administered Medications   Medication Dose Route Frequency    lactated Ringers infusion 1,000 mL  1,000 mL IntraVENous CONTINUOUS       Social History:  Social History   Substance Use Topics    Smoking status: Never Smoker    Smokeless tobacco: Never Used    Alcohol use No         Family History:  Family History   Problem Relation Age of Onset    No Known Problems Father     Heart Attack Mother     Heart Disease Mother     No Known Problems Sister     No Known Problems Brother     Cancer Sister     No Known Problems Sister     No Known Problems Brother     No Known Problems Brother     No Known Problems Brother     No Known Problems Brother        Review of Systems:  A detailed 10 system ROS is obtained, with pertinent positives as listed above. All others are negative. Objective:     Physical Exam:  Vitals:  Visit Vitals    /64    Pulse 60    Temp 98.3 °F (36.8 °C)    Resp 16    Ht 5' 7\" (1.702 m)    Wt 70.3 kg (155 lb)    SpO2 98%    BMI 24.28 kg/m2     Gen:  Pt is alert, cooperative, no acute distress  Skin: no large lesions  HEENT: MMM  Cardiovascular: Regular rate and rhythm. No murmurs, gallops, or rubs. Respiratory:  Comfortable breathing with no accessory muscle use. Clear breath sounds with no wheezes, rales, or rhonchi. GI:  Abdomen nondistended, soft, and nontender. Normal active bowel sounds. Neurological:  Gross memory appears intact. Patient is alert and oriented. Psychiatric:  Mood appears appropriate with judgement intact. Laboratory:    No results for input(s): WBC, HGB, HCT, PLT, MCV, NA, K, CL, CO2, BUN, CREA, CA, MG, GLU, AP, SGOT, GPT, TBIL, CBIL, ALB, TP, AML, LPSE, PTP, INR, APTT, HGBEXT, HCTEXT, PLTEXT in the last 72 hours. No lab exists for component: DBIL, INREXT    Assessment:       Active Problems:    * No active hospital problems. *      Plan:     Endoscopy and risks explained to the patient. Risks including reaction to sedation, cardiopulmonary events, infection, bleeding, perforation, requirement for surgery if complications should occur, death were explained to patient who expressed understanding and agreed to proceed with endoscopy.         Meir Hernandez MD  [facility], [address]

## 2018-01-30 ENCOUNTER — ANESTHESIA (OUTPATIENT)
Dept: ENDOSCOPY | Age: 83
End: 2018-01-30
Payer: MEDICARE

## 2018-01-30 ENCOUNTER — ANESTHESIA EVENT (OUTPATIENT)
Dept: ENDOSCOPY | Age: 83
End: 2018-01-30
Payer: MEDICARE

## 2018-01-30 ENCOUNTER — HOSPITAL ENCOUNTER (OUTPATIENT)
Age: 83
Setting detail: OUTPATIENT SURGERY
Discharge: HOME | End: 2018-01-30
Attending: INTERNAL MEDICINE | Admitting: INTERNAL MEDICINE
Payer: MEDICARE

## 2018-01-30 VITALS
TEMPERATURE: 98.1 F | WEIGHT: 162 LBS | HEIGHT: 68 IN | HEART RATE: 61 BPM | OXYGEN SATURATION: 99 % | BODY MASS INDEX: 24.55 KG/M2 | SYSTOLIC BLOOD PRESSURE: 126 MMHG | DIASTOLIC BLOOD PRESSURE: 58 MMHG | RESPIRATION RATE: 18 BRPM

## 2018-01-30 PROCEDURE — 74011250636 HC RX REV CODE- 250/636: Performed by: ANESTHESIOLOGY

## 2018-01-30 PROCEDURE — 74011250636 HC RX REV CODE- 250/636

## 2018-01-30 PROCEDURE — 76040000025: Performed by: INTERNAL MEDICINE

## 2018-01-30 PROCEDURE — 76060000032 HC ANESTHESIA 0.5 TO 1 HR: Performed by: INTERNAL MEDICINE

## 2018-01-30 RX ORDER — SODIUM CHLORIDE, SODIUM LACTATE, POTASSIUM CHLORIDE, CALCIUM CHLORIDE 600; 310; 30; 20 MG/100ML; MG/100ML; MG/100ML; MG/100ML
100 INJECTION, SOLUTION INTRAVENOUS CONTINUOUS
Status: DISCONTINUED | OUTPATIENT
Start: 2018-01-30 | End: 2018-01-30 | Stop reason: HOSPADM

## 2018-01-30 RX ORDER — SODIUM CHLORIDE 0.9 % (FLUSH) 0.9 %
5-10 SYRINGE (ML) INJECTION AS NEEDED
Status: CANCELLED | OUTPATIENT
Start: 2018-01-30

## 2018-01-30 RX ORDER — SODIUM CHLORIDE, SODIUM LACTATE, POTASSIUM CHLORIDE, CALCIUM CHLORIDE 600; 310; 30; 20 MG/100ML; MG/100ML; MG/100ML; MG/100ML
25 INJECTION, SOLUTION INTRAVENOUS CONTINUOUS
Status: CANCELLED | OUTPATIENT
Start: 2018-01-30 | End: 2018-01-31

## 2018-01-30 RX ORDER — PROPOFOL 10 MG/ML
INJECTION, EMULSION INTRAVENOUS
Status: DISCONTINUED | OUTPATIENT
Start: 2018-01-30 | End: 2018-01-30 | Stop reason: HOSPADM

## 2018-01-30 RX ORDER — FAMOTIDINE 20 MG/1
20 TABLET, FILM COATED ORAL
Status: DISCONTINUED | OUTPATIENT
Start: 2018-01-30 | End: 2018-01-30 | Stop reason: HOSPADM

## 2018-01-30 RX ORDER — FINASTERIDE 5 MG/1
5 TABLET, FILM COATED ORAL DAILY
COMMUNITY

## 2018-01-30 RX ADMIN — PROPOFOL 100 MCG/KG/MIN: 10 INJECTION, EMULSION INTRAVENOUS at 10:54

## 2018-01-30 RX ADMIN — SODIUM CHLORIDE, SODIUM LACTATE, POTASSIUM CHLORIDE, AND CALCIUM CHLORIDE 100 ML/HR: 600; 310; 30; 20 INJECTION, SOLUTION INTRAVENOUS at 10:43

## 2018-01-30 NOTE — ROUTINE PROCESS
Pt. Discharged to car by [facility] with family . Vital signs stable. Able to tolerate PO fluids. Passing gas.  Seen by MD.

## 2018-01-30 NOTE — H&P
Chief complaint/HPI and PMH:  Pt with 10 + year hx of dysphagia with multiple dilations. Last EGD without formal dilation 12/29, Dr. Deann Rao. Most prior dilations with balloon and [place] injections per  [employer]. Today's exam:  LUNGS:  Clear and equal.  COR:  RRR without murmurs heard. NEURO:  A and Oriented fully. I have thoroughly explained the preparation, procedure and sedation process to the patient as well as the risks and alternatives. They will sign informed consent prior to the procedure.         Xiang Talley MD

## 2018-01-30 NOTE — IP AVS SNAPSHOT
[address] 
[phone] Patient: Philippe Hernández MRN: [id number] :[id number] About your hospitalization You were admitted on:  2018 You last received care in the:  [facility] You were discharged on:  2018 Why you were hospitalized Your primary diagnosis was:  Not on File Follow-up Information None Discharge Orders None A check jas indicates which time of day the medication should be taken. My Medications ASK your doctor about these medications Instructions Each Dose to Equal  
 Morning Noon Evening Bedtime  
 doxazosin 4 mg tablet Commonly known as:  CARDURA Your last dose was: Your next dose is: Take 4 mg by mouth nightly. Indications: BENIGN PROSTATIC HYPERTROPHY 4 mg  
    
   
   
   
  
 finasteride 5 mg tablet Commonly known as:  PROSCAR Your last dose was: Your next dose is: Take 5 mg by mouth daily. Indications: benign prostatic hyperplasia with lower urinary tract sx  
 5 mg Discharge Instructions Gastrointestinal Esophagogastroduodenoscopy (EGD) - Upper Exam Discharge Instructions 1. Call Dr. Wei Espinal at [phone] for any problems or questions. 2. Contact the doctor's office for follow up appointment as directed. 3. Medication may cause drowsiness for several hours, therefore, do not drive or operate machinery for remainder of the day. 4. No alcohol today. 5. Ordinarily, you may resume regular diet and activity after exam unless otherwise specified by your physician. 6. For mild soreness in your throat you may use throat lozenges or warm  salt-water gargles as needed. Any additional instructions: Follow up as needed SOFT DIET FOR 24 hours Nexium or Omeprazole Take one tablet 30 minutes before breakfast and one tablet 30 minutes before supper Nothing acidic today-stay away from juices Nothing spicy or hot No NSAIDS for 1 week Introducing [facility]! Esther Patiño introduces [employer] patient portal. Now you can access parts of your medical record, email your doctor's office, and request medication refills online. 1. In your internet browser, go to [email or web address] 2. Click on the First Time User? Click Here link in the Sign In box. You will see the New Member Sign Up page. 3. Enter your [employer] Access Code exactly as it appears below. You will not need to use this code after youve completed the sign-up process. If you do not sign up before the expiration date, you must request a new code. · [employer] Access Code: [email or web address] Expires: 4/24/2018  2:47 PM 
 
4. Enter the last four digits of your Social Security Number (xxxx) and Date of Birth (mm/dd/yyyy) as indicated and click Submit. You will be taken to the next sign-up page. 5. Create a [employer] ID. This will be your [employer] login ID and cannot be changed, so think of one that is secure and easy to remember. 6. Create a [employer] password. You can change your password at any time. 7. Enter your Password Reset Question and Answer. This can be used at a later time if you forget your password. 8. Enter your e-mail address. You will receive e-mail notification when new information is available in [address] 9. Click Sign Up. You can now view and download portions of your medical record. 10. Click the Download Summary menu link to download a portable copy of your medical information. If you have questions, please visit the Frequently Asked Questions section of the [employer] website. Remember, [employer] is NOT to be used for urgent needs. For medical emergencies, dial 911. Now available from your iPhone and Android! Providers Seen During Your Hospitalization Provider Specialty Primary office phone Melinda Díaz MD Gastroenterology [phone] Your Primary Care Physician (PCP) Primary Care Physician Office Phone Office Fax Jessika Deras [phone] You are allergic to the following Allergen Reactions Chocolate Flavor (Flavoring Agent) Rash Recent Documentation Height Weight BMI Smoking Status 1.727 m 73.5 kg 24.63 kg/m2 Never Smoker Emergency Contacts Name Discharge Info Relation Home Work Mobile Remi Hardy(Grandson) DISCHARGE CAREGIVER [3] Other Relative [6] [phone] Patient Belongings The following personal items are in your possession at time of discharge: 
  Dental Appliances: At home  Visual Aid: Glasses Please provide this summary of care documentation to your next provider. Signatures-by signing, you are acknowledging that this After Visit Summary has been reviewed with you and you have received a copy. Patient Signature:  ____________________________________________________________ Date:  ____________________________________________________________  
  
Anibal Gonzales Provider Signature:  ____________________________________________________________ Date:  ____________________________________________________________

## 2018-01-30 NOTE — ANESTHESIA POSTPROCEDURE EVALUATION
Post-Anesthesia Evaluation and Assessment    Patient: Laura Cassidy MRN: [id number]  SSN: [id number]    YOB: 1927  Age: 80 y.o. Sex: male       Cardiovascular Function/Vital Signs  Visit Vitals    /64    Pulse 63    Temp 36.7 °C (98.1 °F)    Resp 16    Ht 5' 8\" (1.727 m)    Wt 73.5 kg (162 lb)    SpO2 95%    BMI 24.63 kg/m2       Patient is status post total IV anesthesia anesthesia for Procedure(s):  ESOPHAGOGASTRODUODENOSCOPY (EGD)   ESOPHAGEAL DILATION. Nausea/Vomiting: None    Postoperative hydration reviewed and adequate. Pain:  Pain Scale 1: Numeric (0 - 10) (01/30/18 1028)  Pain Intensity 1: 0 (01/30/18 1028)   Managed    Neurological Status: At baseline    Mental Status and Level of Consciousness: Arousable    Pulmonary Status:   O2 Device: Nasal cannula (01/30/18 1119)   Adequate oxygenation and airway patent    Complications related to anesthesia: None    Post-anesthesia assessment completed.  No concerns    Signed By: Valetnine Ruiz MD     January 30, 2018

## 2018-01-30 NOTE — PROCEDURES
Esophagogastroduodenoscopy (EGD) Procedure Note    Procedure: EGD    Pre-operative Diagnosis: Esophageal stricture. - Dilated in the past.     Post-operative Diagnosis: Esophageal stricture. Recommendations:   soft diet for 24 hours  No acidic drinks or foods, nothing spicy or hot  PPI twice daily for one week then as directed thereafter. Report any significant chest pain, painful swallowing after dilation. Avoid NSAIDs for 1 week. Follow up:   PRN for EGD and dilation as needed. Anesthesia/Sedation:  MAC, (see separate report). Procedure Details:  Informed consent was obtained for the procedure, including sedation. Risks of perforation, hemorrhage, adverse drug reaction and aspiration were discussed. The patient was placed in the left lateral decubitus position. Based on the pre-procedure assessment, including review of the patient's medical history, medications, allergies, and review of systems, he had been deemed to be an appropriate candidate for anesthesia. The patient was monitored continuously with ECG tracing, pulse oximetry, blood pressure monitoring, and direct observations. Please refer to the anesthesia record for details and dosages of medications. The esophagus was intubated with direct visualization. The esophagus, stomach and duodenum were traversed to the full extent of the endoscope. Retroflexed views of the cardia and fundus were performed. The stomach was fully insufflated and deflated. Findings:   OROPHARYNX: The oropharynx was briefly viewed on entry and withdrawal with very brief evaluation of the cords, arytenoids and pyriform sinuses. No abnormalities found. ESOPHAGUS:  There was a benign-appearing stricture at the  With significant remodeling and previous dilation. The endoscope passed through [facility] dilation was performed sequentially with endoscopies  After each dilation. 36, 43, 40 and 55 [place] were passed with little resistance.   There was modest disruption after 44 and a significant tear afte, 1.5 cm, no evidence of perforation and no active bleeding. STOMACH: The gastric pouch inflated and deflated normally. The mucosal surface and gastric rugae were normal without erosions, ulcerations, raised lesions, vascular ectasias or other anomalies. The pylorus was patent to passage of the endoscope without difficulty. DUODENUM:  The endoscope was easily passed into the third section of the duodenum. The villous mucosa was normal without blunting or scalloped folds. There were no mucosal erosions, ulcerations, raised lesions or vascular ectasias. EBL: 0 cc's. Pathology speciment:  None. Complications: No apparent complications and the patient tolerated sedation and the procedure well. Attending Attestation: I performed the procedure.     Xiang Talley MD

## 2018-01-30 NOTE — DISCHARGE INSTRUCTIONS
Gastrointestinal Esophagogastroduodenoscopy (EGD) - Upper Exam Discharge Instructions    1. Call Dr. Conrado Scott at [phone] for any problems or questions. 2. Contact the doctor's office for follow up appointment as directed. 3. Medication may cause drowsiness for several hours, therefore, do not drive or operate machinery for remainder of the day. 4. No alcohol today. 5. Ordinarily, you may resume regular diet and activity after exam unless otherwise specified by your physician. 6. For mild soreness in your throat you may use throat lozenges or warm  salt-water gargles as needed. Any additional instructions:   Follow up as needed    SOFT DIET FOR 24 hours    Nexium or Omeprazole   Take one tablet 30 minutes before breakfast and one tablet 30 minutes before supper    Nothing acidic today-stay away from juices   Nothing spicy or hot    No NSAIDS for 1 week

## 2022-04-03 ENCOUNTER — APPOINTMENT (OUTPATIENT)
Dept: GENERAL RADIOLOGY | Age: 87
DRG: 391 | End: 2022-04-03
Attending: EMERGENCY MEDICINE
Payer: MEDICARE

## 2022-04-03 ENCOUNTER — HOSPITAL ENCOUNTER (INPATIENT)
Age: 87
LOS: 2 days | Discharge: HOSPICE | DRG: 391 | End: 2022-04-05
Attending: EMERGENCY MEDICINE | Admitting: HOSPITALIST
Payer: MEDICARE

## 2022-04-03 DIAGNOSIS — N17.9 ACUTE KIDNEY INJURY (HCC): Primary | ICD-10-CM

## 2022-04-03 DIAGNOSIS — R53.83 FATIGUE, UNSPECIFIED TYPE: ICD-10-CM

## 2022-04-03 DIAGNOSIS — F05 DELIRIUM DUE TO ANOTHER MEDICAL CONDITION, ACUTE, HYPOACTIVE: ICD-10-CM

## 2022-04-03 DIAGNOSIS — R11.10 REGURGITATION OF FOOD: ICD-10-CM

## 2022-04-03 DIAGNOSIS — R05.9 COUGH: ICD-10-CM

## 2022-04-03 LAB
ALBUMIN SERPL-MCNC: 2.5 G/DL (ref 3.2–4.6)
ALBUMIN/GLOB SERPL: 0.5 {RATIO} (ref 1.2–3.5)
ALP SERPL-CCNC: 65 U/L (ref 50–136)
ALT SERPL-CCNC: 35 U/L (ref 12–65)
ANION GAP SERPL CALC-SCNC: 4 MMOL/L (ref 7–16)
APPEARANCE UR: ABNORMAL
AST SERPL-CCNC: 26 U/L (ref 15–37)
ATRIAL RATE: 89 BPM
BACTERIA URNS QL MICRO: ABNORMAL /HPF
BASOPHILS # BLD: 0 K/UL (ref 0–0.2)
BASOPHILS NFR BLD: 0 % (ref 0–2)
BILIRUB SERPL-MCNC: 0.6 MG/DL (ref 0.2–1.1)
BILIRUB UR QL: NEGATIVE
BUN SERPL-MCNC: 42 MG/DL (ref 8–23)
CALCIUM SERPL-MCNC: 9.1 MG/DL (ref 8.3–10.4)
CALCULATED P AXIS, ECG09: 67 DEGREES
CALCULATED R AXIS, ECG10: -131 DEGREES
CALCULATED T AXIS, ECG11: 46 DEGREES
CASTS URNS QL MICRO: 0 /LPF
CHLORIDE SERPL-SCNC: 115 MMOL/L (ref 98–107)
CO2 SERPL-SCNC: 30 MMOL/L (ref 21–32)
COLOR UR: YELLOW
COVID-19 RAPID TEST, COVR: NOT DETECTED
CREAT SERPL-MCNC: 1.6 MG/DL (ref 0.8–1.5)
CRYSTALS URNS QL MICRO: 0 /LPF
DIAGNOSIS, 93000: NORMAL
DIFFERENTIAL METHOD BLD: ABNORMAL
EOSINOPHIL # BLD: 1.2 K/UL (ref 0–0.8)
EOSINOPHIL NFR BLD: 13 % (ref 0.5–7.8)
EPI CELLS #/AREA URNS HPF: ABNORMAL /HPF
ERYTHROCYTE [DISTWIDTH] IN BLOOD BY AUTOMATED COUNT: 13.2 % (ref 11.9–14.6)
GLOBULIN SER CALC-MCNC: 5 G/DL (ref 2.3–3.5)
GLUCOSE SERPL-MCNC: 198 MG/DL (ref 65–100)
GLUCOSE UR STRIP.AUTO-MCNC: NEGATIVE MG/DL
HCT VFR BLD AUTO: 38.4 % (ref 41.1–50.3)
HGB BLD-MCNC: 11.9 G/DL (ref 13.6–17.2)
HGB UR QL STRIP: ABNORMAL
IMM GRANULOCYTES # BLD AUTO: 0 K/UL (ref 0–0.5)
IMM GRANULOCYTES NFR BLD AUTO: 0 % (ref 0–5)
KETONES UR QL STRIP.AUTO: NEGATIVE MG/DL
LACTATE SERPL-SCNC: 1.8 MMOL/L (ref 0.4–2)
LEUKOCYTE ESTERASE UR QL STRIP.AUTO: ABNORMAL
LYMPHOCYTES # BLD: 2 K/UL (ref 0.5–4.6)
LYMPHOCYTES NFR BLD: 21 % (ref 13–44)
MCH RBC QN AUTO: 32.2 PG (ref 26.1–32.9)
MCHC RBC AUTO-ENTMCNC: 31 G/DL (ref 31.4–35)
MCV RBC AUTO: 103.8 FL (ref 79.6–97.8)
MONOCYTES # BLD: 0.8 K/UL (ref 0.1–1.3)
MONOCYTES NFR BLD: 8 % (ref 4–12)
MUCOUS THREADS URNS QL MICRO: ABNORMAL /LPF
NEUTS SEG # BLD: 5.4 K/UL (ref 1.7–8.2)
NEUTS SEG NFR BLD: 57 % (ref 43–78)
NITRITE UR QL STRIP.AUTO: POSITIVE
NRBC # BLD: 0 K/UL (ref 0–0.2)
P-R INTERVAL, ECG05: 164 MS
PH UR STRIP: 8 [PH] (ref 5–9)
PLATELET # BLD AUTO: 327 K/UL (ref 150–450)
PMV BLD AUTO: 11.3 FL (ref 9.4–12.3)
POTASSIUM SERPL-SCNC: 3.6 MMOL/L (ref 3.5–5.1)
PROCALCITONIN SERPL-MCNC: <0.05 NG/ML (ref 0–0.49)
PROT SERPL-MCNC: 7.5 G/DL (ref 6.3–8.2)
PROT UR STRIP-MCNC: ABNORMAL MG/DL
Q-T INTERVAL, ECG07: 448 MS
QRS DURATION, ECG06: 142 MS
QTC CALCULATION (BEZET), ECG08: 546 MS
RBC # BLD AUTO: 3.7 M/UL (ref 4.23–5.6)
RBC #/AREA URNS HPF: ABNORMAL /HPF
SODIUM SERPL-SCNC: 149 MMOL/L (ref 138–145)
SOURCE, COVRS: NORMAL
SP GR UR REFRACTOMETRY: 1.01 (ref 1–1.02)
UROBILINOGEN UR QL STRIP.AUTO: 0.2 EU/DL (ref 0.2–1)
VENTRICULAR RATE, ECG03: 89 BPM
WBC # BLD AUTO: 9.4 K/UL (ref 4.3–11.1)
WBC URNS QL MICRO: ABNORMAL /HPF

## 2022-04-03 PROCEDURE — 84145 PROCALCITONIN (PCT): CPT

## 2022-04-03 PROCEDURE — 87205 SMEAR GRAM STAIN: CPT

## 2022-04-03 PROCEDURE — 87635 SARS-COV-2 COVID-19 AMP PRB: CPT

## 2022-04-03 PROCEDURE — 81015 MICROSCOPIC EXAM OF URINE: CPT

## 2022-04-03 PROCEDURE — 83605 ASSAY OF LACTIC ACID: CPT

## 2022-04-03 PROCEDURE — 87186 SC STD MICRODIL/AGAR DIL: CPT

## 2022-04-03 PROCEDURE — 71045 X-RAY EXAM CHEST 1 VIEW: CPT

## 2022-04-03 PROCEDURE — 93005 ELECTROCARDIOGRAM TRACING: CPT | Performed by: EMERGENCY MEDICINE

## 2022-04-03 PROCEDURE — 86580 TB INTRADERMAL TEST: CPT | Performed by: HOSPITALIST

## 2022-04-03 PROCEDURE — 87150 DNA/RNA AMPLIFIED PROBE: CPT

## 2022-04-03 PROCEDURE — 96374 THER/PROPH/DIAG INJ IV PUSH: CPT

## 2022-04-03 PROCEDURE — 80053 COMPREHEN METABOLIC PANEL: CPT

## 2022-04-03 PROCEDURE — 74011250636 HC RX REV CODE- 250/636: Performed by: STUDENT IN AN ORGANIZED HEALTH CARE EDUCATION/TRAINING PROGRAM

## 2022-04-03 PROCEDURE — 94762 N-INVAS EAR/PLS OXIMTRY CONT: CPT

## 2022-04-03 PROCEDURE — 74011000258 HC RX REV CODE- 258: Performed by: EMERGENCY MEDICINE

## 2022-04-03 PROCEDURE — 65270000029 HC RM PRIVATE

## 2022-04-03 PROCEDURE — 93005 ELECTROCARDIOGRAM TRACING: CPT | Performed by: STUDENT IN AN ORGANIZED HEALTH CARE EDUCATION/TRAINING PROGRAM

## 2022-04-03 PROCEDURE — 74011000250 HC RX REV CODE- 250: Performed by: HOSPITALIST

## 2022-04-03 PROCEDURE — 51798 US URINE CAPACITY MEASURE: CPT

## 2022-04-03 PROCEDURE — 87040 BLOOD CULTURE FOR BACTERIA: CPT

## 2022-04-03 PROCEDURE — 85025 COMPLETE CBC W/AUTO DIFF WBC: CPT

## 2022-04-03 PROCEDURE — 87086 URINE CULTURE/COLONY COUNT: CPT

## 2022-04-03 PROCEDURE — 87088 URINE BACTERIA CULTURE: CPT

## 2022-04-03 PROCEDURE — 74011250636 HC RX REV CODE- 250/636: Performed by: EMERGENCY MEDICINE

## 2022-04-03 PROCEDURE — 81001 URINALYSIS AUTO W/SCOPE: CPT

## 2022-04-03 PROCEDURE — 99285 EMERGENCY DEPT VISIT HI MDM: CPT

## 2022-04-03 RX ORDER — SODIUM CHLORIDE 0.9 % (FLUSH) 0.9 %
5-10 SYRINGE (ML) INJECTION AS NEEDED
Status: DISCONTINUED | OUTPATIENT
Start: 2022-04-03 | End: 2022-04-05 | Stop reason: HOSPADM

## 2022-04-03 RX ORDER — SODIUM CHLORIDE 0.9 % (FLUSH) 0.9 %
5-40 SYRINGE (ML) INJECTION EVERY 8 HOURS
Status: DISCONTINUED | OUTPATIENT
Start: 2022-04-03 | End: 2022-04-05 | Stop reason: HOSPADM

## 2022-04-03 RX ORDER — SODIUM CHLORIDE 450 MG/100ML
50 INJECTION, SOLUTION INTRAVENOUS CONTINUOUS
Status: DISCONTINUED | OUTPATIENT
Start: 2022-04-03 | End: 2022-04-04

## 2022-04-03 RX ORDER — SODIUM CHLORIDE 0.9 % (FLUSH) 0.9 %
5-40 SYRINGE (ML) INJECTION AS NEEDED
Status: DISCONTINUED | OUTPATIENT
Start: 2022-04-03 | End: 2022-04-05 | Stop reason: HOSPADM

## 2022-04-03 RX ORDER — SODIUM CHLORIDE 0.9 % (FLUSH) 0.9 %
5-10 SYRINGE (ML) INJECTION EVERY 8 HOURS
Status: DISCONTINUED | OUTPATIENT
Start: 2022-04-03 | End: 2022-04-05 | Stop reason: HOSPADM

## 2022-04-03 RX ORDER — FINASTERIDE 5 MG/1
5 TABLET, FILM COATED ORAL DAILY
Status: DISCONTINUED | OUTPATIENT
Start: 2022-04-04 | End: 2022-04-05 | Stop reason: HOSPADM

## 2022-04-03 RX ORDER — SENNOSIDES 8.6 MG/1
2 TABLET ORAL
Status: DISCONTINUED | OUTPATIENT
Start: 2022-04-03 | End: 2022-04-05 | Stop reason: HOSPADM

## 2022-04-03 RX ORDER — DOXAZOSIN 1 MG/1
4 TABLET ORAL
Status: DISCONTINUED | OUTPATIENT
Start: 2022-04-03 | End: 2022-04-05 | Stop reason: HOSPADM

## 2022-04-03 RX ORDER — ACETAMINOPHEN 650 MG/1
650 SUPPOSITORY RECTAL
Status: DISCONTINUED | OUTPATIENT
Start: 2022-04-03 | End: 2022-04-05 | Stop reason: HOSPADM

## 2022-04-03 RX ORDER — ACETAMINOPHEN 325 MG/1
650 TABLET ORAL
Status: DISCONTINUED | OUTPATIENT
Start: 2022-04-03 | End: 2022-04-05 | Stop reason: HOSPADM

## 2022-04-03 RX ORDER — ONDANSETRON 2 MG/ML
4 INJECTION INTRAMUSCULAR; INTRAVENOUS
Status: DISCONTINUED | OUTPATIENT
Start: 2022-04-03 | End: 2022-04-05 | Stop reason: HOSPADM

## 2022-04-03 RX ADMIN — SODIUM CHLORIDE 50 ML/HR: 450 INJECTION, SOLUTION INTRAVENOUS at 16:56

## 2022-04-03 RX ADMIN — SODIUM CHLORIDE, PRESERVATIVE FREE 10 ML: 5 INJECTION INTRAVENOUS at 21:05

## 2022-04-03 RX ADMIN — SODIUM CHLORIDE, PRESERVATIVE FREE 10 ML: 5 INJECTION INTRAVENOUS at 16:56

## 2022-04-03 RX ADMIN — CEFTRIAXONE 2 G: 2 INJECTION, POWDER, FOR SOLUTION INTRAMUSCULAR; INTRAVENOUS at 12:08

## 2022-04-03 RX ADMIN — TUBERCULIN PURIFIED PROTEIN DERIVATIVE 5 UNITS: 5 INJECTION, SOLUTION INTRADERMAL at 16:57

## 2022-04-03 RX ADMIN — SODIUM CHLORIDE, PRESERVATIVE FREE 10 ML: 5 INJECTION INTRAVENOUS at 21:04

## 2022-04-03 RX ADMIN — SODIUM CHLORIDE, SODIUM LACTATE, POTASSIUM CHLORIDE, AND CALCIUM CHLORIDE 1000 ML: 600; 310; 30; 20 INJECTION, SOLUTION INTRAVENOUS at 10:38

## 2022-04-03 NOTE — PROGRESS NOTES
TRANSFER - IN REPORT:    Verbal report received from ED(name) on Chantel Blade  being received from [facility](unit) for routine progression of care      Report consisted of patients Situation, Background, Assessment and   Recommendations(SBAR). Information from the following report(s) SBAR was reviewed with the receiving nurse. Opportunity for questions and clarification was provided. Assessment completed upon patients arrival to unit and care assumed.

## 2022-04-03 NOTE — CONSULTS
[facility] Consult Note       Primary GI Physician:  Previous Dr. Dejuan Martinez. Referring Provider:  Dr. Adam Fee Date:  4/3/2022    Admit Date:  4/3/2022    Chief Complaint:  Hx of Esophageal strictures    Subjective:     History of Present Illness:  Patient is a 80 y.o. male with PMH including but not limited to GERD, Esophageal Strictures , who is seen in consultation at the request of Dr. Angela Dao for hx of esophageal strictures. Patient was brought to the ED today via EMS for AMS. Patient's Ryan Quiroz reports that the patient lives alone and family checks in frequently. They have noticed increased weakness over the past 48 hours with decreased PO intake. He has had some regurgitation and vomiting on occasion. He was also noted to have strong urine odor and skin breakdown on his buttocks. Patient's grandson helps with hx. He has been having difficulty eating. Has been living on mostly ensure, coffee, and mountain dew. He has recently become more weak. Now, with AMS. He had a BM 2 days ago. No known recent melena/hematochezia. Rapid covid is negative. Labs: WBC 9.4, HGB 11.9, , PlT 327, Na 149, Creatinine 1.60, TB 0.6, AST 26,, ALT 35, AP 65, lactic acid 1.8. UA with nitrites and bacteria. CXR clear. He has been started on Rocephin IV. EGD 1/30/2018 with Dr. Ron Sneed  Findings:   OROPHARYNX: The oropharynx was briefly viewed on entry and withdrawal with very brief evaluation of the cords, arytenoids and pyriform sinuses. No abnormalities found.       ESOPHAGUS:  There was a benign-appearing stricture at the  With significant remodeling and previous dilation. The endoscope passed through [facility] dilation was performed sequentially with endoscopies  After each dilation. 36, 43, 40 and 55 Western Abbie were passed with little resistance.   There was modest disruption after 44 and a significant tear afte, 1.5 cm, no evidence of perforation and no active bleeding.     STOMACH: The gastric pouch inflated and deflated normally. The mucosal surface and gastric rugae were normal without erosions, ulcerations, raised lesions, vascular ectasias or other anomalies. The pylorus was patent to passage of the endoscope without difficulty.      DUODENUM:  The endoscope was easily passed into the third section of the duodenum. The villous mucosa was normal without blunting or scalloped folds. There were no mucosal erosions, ulcerations, raised lesions or vascular ectasias.      EBL: 0 cc's.     Pathology speciment:  None. Complications: No apparent complications and the patient tolerated sedation and the procedure well. Attending Attestation: I performed the procedure.     Ilene Martínez MD    PMH:  Past Medical History:   Diagnosis Date    Constipation     occassional, manage with OTC PRN med    Esophageal stenosis     GERD (gastroesophageal reflux disease)     hx of, no current episodes    History of pneumonia 12/2013    X1    [place] (hard of hearing)     pt doesnt wear any hearing aids    Ill-defined condition     \"burning in feet\" per pt; occasional/episodic.- Treated at [facility] in [place], getting injection       PSH:  Past Surgical History:   Procedure Laterality Date    HX APPENDECTOMY  age 19's    HX CATARACT REMOVAL Bilateral     HX COLONOSCOPY      HX ENDOSCOPY  11/2015       Allergies: Allergies   Allergen Reactions    Chocolate Flavor [Flavoring Agent] Rash       Home Medications:  Prior to Admission medications    Medication Sig Start Date End Date Taking? Authorizing Provider   finasteride (PROSCAR) 5 mg tablet Take 5 mg by mouth daily. Indications: benign prostatic hyperplasia with lower urinary tract sx    Provider, Historical   doxazosin (CARDURA) 4 mg tablet Take 4 mg by mouth nightly.  Indications: BENIGN PROSTATIC HYPERTROPHY    Provider, Historical       Hospital Medications:  Current Facility-Administered Medications   Medication Dose Route Frequency    sodium chloride (NS) flush 5-10 mL  5-10 mL IntraVENous Q8H    sodium chloride (NS) flush 5-10 mL  5-10 mL IntraVENous PRN    0.45% sodium chloride infusion  50 mL/hr IntraVENous CONTINUOUS     Current Outpatient Medications   Medication Sig    finasteride (PROSCAR) 5 mg tablet Take 5 mg by mouth daily. Indications: benign prostatic hyperplasia with lower urinary tract sx    doxazosin (CARDURA) 4 mg tablet Take 4 mg by mouth nightly. Indications: BENIGN PROSTATIC HYPERTROPHY       Social History:  Social History     Tobacco Use    Smoking status: Never Smoker    Smokeless tobacco: Never Used   Substance Use Topics    Alcohol use: No       Pt denies any history of drug use, blood transfusions, or tattoos. Family History:  Family History   Problem Relation Age of Onset    No Known Problems Father     Heart Attack Mother     Heart Disease Mother     No Known Problems Sister     No Known Problems Brother     Cancer Sister     No Known Problems Sister     Heart Attack Brother     No Known Problems Brother     No Known Problems Brother     No Known Problems Brother        Review of Systems:  A detailed 10 system ROS is obtained, with pertinent positives as listed above. All others are negative. Diet:  NPO    Objective:     Physical Exam:  Vitals:  Visit Vitals  /73   Pulse 82   Temp 98.7 °F (37.1 °C)   Resp 17   SpO2 98%     Gen:  Frail appearing elderly male  Skin:  Extremities and face reveal no rashes. HEENT: Sclerae anicteric. Extra-occular muscles are intact. No oral ulcers. No abnormal pigmentation of the lips. The neck is supple. Cardiovascular: Tachycardic. No murmurs, gallops, or rubs. Respiratory:  Comfortable breathing with no accessory muscle use. Clear breath sounds anteriorly with no wheezes, rales, or rhonchi. GI:  Abdomen nondistended, soft, and nontender. Normal active bowel sounds. No enlargement of the liver or spleen.  No masses palpable. Rectal:  Deferred  Musculoskeletal:  No pitting edema of the lower legs. Neurological: Alert. Poor historian  Psychiatric:  Mood appears appropriate with judgement intact. Lymphatic:  No cervical or supraclavicular adenopathy. Laboratory:    Recent Labs     04/03/22  1037   WBC 9.4   HGB 11.9*   HCT 38.4*      .8*   *   K 3.6   *   CO2 30   BUN 42*   CREA 1.60*   CA 9.1   *   AP 65   AST 26   ALT 35   TBILI 0.6   ALB 2.5*   TP 7.5        Results for Aniket Staley (MRN [id number]) as of 4/3/2022 14:55   Ref. Range 4/3/2022 12:45   Color Latest Units:   YELLOW   Appearance Latest Units:   CLOUDY   Specific gravity Latest Ref Range: 1.001 - 1.023   1.010   pH (UA) Latest Ref Range: 5.0 - 9.0   8.0   Protein Latest Ref Range: NEG mg/dL TRACE (A)   Glucose Latest Units: mg/dL Negative   Ketone Latest Ref Range: NEG mg/dL Negative   Blood Latest Ref Range: NEG   LARGE (A)   Bilirubin Latest Ref Range: NEG   Negative   Urobilinogen Latest Ref Range: 0.2 - 1.0 EU/dL 0.2   Nitrites Latest Ref Range: NEG   Positive (A)   Leukocyte Esterase Latest Ref Range: NEG   LARGE (A)   Epithelial cells Latest Ref Range: 0 /hpf 0-3   Mucus Latest Ref Range: 0 /lpf 1+ (H)   WBC Latest Ref Range: 0 /hpf 0-3   RBC Latest Ref Range: 0 /hpf 20-50   Bacteria Latest Ref Range: 0 /hpf TRACE   Crystals, urine Latest Ref Range: 0 /LPF 0   Casts Latest Ref Range: 0 /lpf 0         Assessment:     Active Problems:    CATE (acute kidney injury) ([address] (4/3/2022)    Patient is a 80 y.o. male with PMH including but not limited to GERD, Esophageal Strictures , who is seen in consultation at the request of Dr. Nolan Carrillo for hx of esophageal strictures. Rapid covid is negative. Labs: WBC 9.4, HGB 11.9, , PlT 327, Na 149, Creatinine 1.60, TB 0.6, AST 26,, ALT 35, AP 65, lactic acid 1.8. UA with nitrites and bacteria. CXR clear. He has been started on Rocephin IV.      Last EGD was in 1/2018: ESOPHAGUS:  There was a benign-appearing stricture at the  With significant remodeling and previous dilation. The endoscope passed through [facility] dilation was performed sequentially with endoscopies  After each dilation. 36, 43, 40 and 55 Western Abbie were passed with little resistance. There was modest disruption after 44 and a significant tear afte, 1.5 cm, no evidence of perforation and no active bleeding. Plan:     PPI IV BID  On rocephin for UTI  Noted to be hypernatremic  Clear liquid diet today  NPO after midnight  EGD on Monday 4/4 with Dr. Breanna Mcintosh with possible dilation     Risk of procedure discussed with patient's family at the bedside to include risk of infection, bleeding, perforation, sedation reaction, exacerbation of underlying medical conditions, and death. Patient's family voiced understanding and wishes to plan for procedure when appropriate. Davida Crum NP    Patient is seen and examined in collaboration with Dr. Draia Marlow. Assessment and plan as per Dr. Daria Marlow.

## 2022-04-03 NOTE — ED NOTES
TRANSFER - OUT REPORT:    Verbal report given to Monica Carvalho on Michael Blanco  being transferred to [facility]  for routine progression of care       Report consisted of patients Situation, Background, Assessment and   Recommendations(SBAR). Information from the following report(s) Ed summary was reviewed with the receiving nurse. Lines:   Peripheral IV 04/03/22 Right;Upper Forearm (Active)   Site Assessment Clean, dry, & intact 04/03/22 1226   Phlebitis Assessment 0 04/03/22 1226   Infiltration Assessment 0 04/03/22 1226   Dressing Status Clean, dry, & intact 04/03/22 1226   Dressing Type Transparent 04/03/22 1226   Hub Color/Line Status Pink 04/03/22 1226        Opportunity for questions and clarification was provided.       Patient transported with:  transport

## 2022-04-03 NOTE — H&P (VIEW-ONLY)
[facility] Consult Note       Primary GI Physician:  Previous Dr. Joe Manuel. Referring Provider:  Dr. Ayesha Maharaj Date:  4/3/2022    Admit Date:  4/3/2022    Chief Complaint:  Hx of Esophageal strictures    Subjective:     History of Present Illness:  Patient is a 80 y.o. male with PMH including but not limited to GERD, Esophageal Strictures , who is seen in consultation at the request of Dr. Sara Wesley for hx of esophageal strictures. Patient was brought to the ED today via EMS for AMS. Patient's Courtney Vu reports that the patient lives alone and family checks in frequently. They have noticed increased weakness over the past 48 hours with decreased PO intake. He has had some regurgitation and vomiting on occasion. He was also noted to have strong urine odor and skin breakdown on his buttocks. Patient's grandson helps with hx. He has been having difficulty eating. Has been living on mostly ensure, coffee, and mountain dew. He has recently become more weak. Now, with AMS. He had a BM 2 days ago. No known recent melena/hematochezia. Rapid covid is negative. Labs: WBC 9.4, HGB 11.9, , PlT 327, Na 149, Creatinine 1.60, TB 0.6, AST 26,, ALT 35, AP 65, lactic acid 1.8. UA with nitrites and bacteria. CXR clear. He has been started on Rocephin IV. EGD 1/30/2018 with Dr. Doug Narayan  Findings:   OROPHARYNX: The oropharynx was briefly viewed on entry and withdrawal with very brief evaluation of the cords, arytenoids and pyriform sinuses. No abnormalities found.       ESOPHAGUS:  There was a benign-appearing stricture at the  With significant remodeling and previous dilation. The endoscope passed through [facility] dilation was performed sequentially with endoscopies  After each dilation. 36, 43, 40 and 55 Western Abbie were passed with little resistance.   There was modest disruption after 44 and a significant tear afte, 1.5 cm, no evidence of perforation and no active bleeding.     STOMACH: The gastric pouch inflated and deflated normally. The mucosal surface and gastric rugae were normal without erosions, ulcerations, raised lesions, vascular ectasias or other anomalies. The pylorus was patent to passage of the endoscope without difficulty.      DUODENUM:  The endoscope was easily passed into the third section of the duodenum. The villous mucosa was normal without blunting or scalloped folds. There were no mucosal erosions, ulcerations, raised lesions or vascular ectasias.      EBL: 0 cc's.     Pathology speciment:  None. Complications: No apparent complications and the patient tolerated sedation and the procedure well. Attending Attestation: I performed the procedure.     Neelima Rahman MD    PMH:  Past Medical History:   Diagnosis Date    Constipation     occassional, manage with OTC PRN med    Esophageal stenosis     GERD (gastroesophageal reflux disease)     hx of, no current episodes    History of pneumonia 12/2013    X1    [place] (hard of hearing)     pt doesnt wear any hearing aids    Ill-defined condition     \"burning in feet\" per pt; occasional/episodic.- Treated at [facility] in [place], getting injection       PSH:  Past Surgical History:   Procedure Laterality Date    HX APPENDECTOMY  age 19's    HX CATARACT REMOVAL Bilateral     HX COLONOSCOPY      HX ENDOSCOPY  11/2015       Allergies: Allergies   Allergen Reactions    Chocolate Flavor [Flavoring Agent] Rash       Home Medications:  Prior to Admission medications    Medication Sig Start Date End Date Taking? Authorizing Provider   finasteride (PROSCAR) 5 mg tablet Take 5 mg by mouth daily. Indications: benign prostatic hyperplasia with lower urinary tract sx    Provider, Historical   doxazosin (CARDURA) 4 mg tablet Take 4 mg by mouth nightly.  Indications: BENIGN PROSTATIC HYPERTROPHY    Provider, Historical       Hospital Medications:  Current Facility-Administered Medications   Medication Dose Route Frequency    sodium chloride (NS) flush 5-10 mL  5-10 mL IntraVENous Q8H    sodium chloride (NS) flush 5-10 mL  5-10 mL IntraVENous PRN    0.45% sodium chloride infusion  50 mL/hr IntraVENous CONTINUOUS     Current Outpatient Medications   Medication Sig    finasteride (PROSCAR) 5 mg tablet Take 5 mg by mouth daily. Indications: benign prostatic hyperplasia with lower urinary tract sx    doxazosin (CARDURA) 4 mg tablet Take 4 mg by mouth nightly. Indications: BENIGN PROSTATIC HYPERTROPHY       Social History:  Social History     Tobacco Use    Smoking status: Never Smoker    Smokeless tobacco: Never Used   Substance Use Topics    Alcohol use: No       Pt denies any history of drug use, blood transfusions, or tattoos. Family History:  Family History   Problem Relation Age of Onset    No Known Problems Father     Heart Attack Mother     Heart Disease Mother     No Known Problems Sister     No Known Problems Brother     Cancer Sister     No Known Problems Sister     Heart Attack Brother     No Known Problems Brother     No Known Problems Brother     No Known Problems Brother        Review of Systems:  A detailed 10 system ROS is obtained, with pertinent positives as listed above. All others are negative. Diet:  NPO    Objective:     Physical Exam:  Vitals:  Visit Vitals  /73   Pulse 82   Temp 98.7 °F (37.1 °C)   Resp 17   SpO2 98%     Gen:  Frail appearing elderly male  Skin:  Extremities and face reveal no rashes. HEENT: Sclerae anicteric. Extra-occular muscles are intact. No oral ulcers. No abnormal pigmentation of the lips. The neck is supple. Cardiovascular: Tachycardic. No murmurs, gallops, or rubs. Respiratory:  Comfortable breathing with no accessory muscle use. Clear breath sounds anteriorly with no wheezes, rales, or rhonchi. GI:  Abdomen nondistended, soft, and nontender. Normal active bowel sounds. No enlargement of the liver or spleen.  No masses palpable. Rectal:  Deferred  Musculoskeletal:  No pitting edema of the lower legs. Neurological: Alert. Poor historian  Psychiatric:  Mood appears appropriate with judgement intact. Lymphatic:  No cervical or supraclavicular adenopathy. Laboratory:    Recent Labs     04/03/22  1037   WBC 9.4   HGB 11.9*   HCT 38.4*      .8*   *   K 3.6   *   CO2 30   BUN 42*   CREA 1.60*   CA 9.1   *   AP 65   AST 26   ALT 35   TBILI 0.6   ALB 2.5*   TP 7.5        Results for Jessica Wilson (MRN [id number]) as of 4/3/2022 14:55   Ref. Range 4/3/2022 12:45   Color Latest Units:   YELLOW   Appearance Latest Units:   CLOUDY   Specific gravity Latest Ref Range: 1.001 - 1.023   1.010   pH (UA) Latest Ref Range: 5.0 - 9.0   8.0   Protein Latest Ref Range: NEG mg/dL TRACE (A)   Glucose Latest Units: mg/dL Negative   Ketone Latest Ref Range: NEG mg/dL Negative   Blood Latest Ref Range: NEG   LARGE (A)   Bilirubin Latest Ref Range: NEG   Negative   Urobilinogen Latest Ref Range: 0.2 - 1.0 EU/dL 0.2   Nitrites Latest Ref Range: NEG   Positive (A)   Leukocyte Esterase Latest Ref Range: NEG   LARGE (A)   Epithelial cells Latest Ref Range: 0 /hpf 0-3   Mucus Latest Ref Range: 0 /lpf 1+ (H)   WBC Latest Ref Range: 0 /hpf 0-3   RBC Latest Ref Range: 0 /hpf 20-50   Bacteria Latest Ref Range: 0 /hpf TRACE   Crystals, urine Latest Ref Range: 0 /LPF 0   Casts Latest Ref Range: 0 /lpf 0         Assessment:     Active Problems:    CATE (acute kidney injury) ([facility] [address] (4/3/2022)    Patient is a 80 y.o. male with PMH including but not limited to GERD, Esophageal Strictures , who is seen in consultation at the request of Dr. Jo Ann Miller for hx of esophageal strictures. Rapid covid is negative. Labs: WBC 9.4, HGB 11.9, , PlT 327, Na 149, Creatinine 1.60, TB 0.6, AST 26,, ALT 35, AP 65, lactic acid 1.8. UA with nitrites and bacteria. CXR clear. He has been started on Rocephin IV.      Last EGD was in 1/2018: ESOPHAGUS:  There was a benign-appearing stricture at the  With significant remodeling and previous dilation. The endoscope passed through [facility] dilation was performed sequentially with endoscopies  After each dilation. 36, 43, 40 and 55 Western Abbie were passed with little resistance. There was modest disruption after 44 and a significant tear afte, 1.5 cm, no evidence of perforation and no active bleeding. Plan:     PPI IV BID  On rocephin for UTI  Noted to be hypernatremic  Clear liquid diet today  NPO after midnight  EGD on Monday 4/4 with Dr. Lizbet Correia with possible dilation     Risk of procedure discussed with patient's family at the bedside to include risk of infection, bleeding, perforation, sedation reaction, exacerbation of underlying medical conditions, and death. Patient's family voiced understanding and wishes to plan for procedure when appropriate. Ramón Alvarez NP    Patient is seen and examined in collaboration with Dr. Alexia Braswell. Assessment and plan as per Dr. Alexia Braswell.

## 2022-04-03 NOTE — PROGRESS NOTES
Reviewed notes for new spiritual concerns      Per notes:        RISK FOR DECLINE    Yarsani    LOCAL

## 2022-04-03 NOTE — ED TRIAGE NOTES
Problem: Activity Intolerance  Goal: # Functional status is maintained or returned to baseline  Outcome: Outcome Met, Continue evaluating goal progress toward completion  Goal: # Tolerates activity for d/c setting with no clinical problems  Outcome: Outcome Met, Continue evaluating goal progress toward completion     Problem: At Risk for Falls  Goal: # Patient does not fall  Outcome: Outcome Met, Continue evaluating goal progress toward completion  Goal: # Takes action to control fall-related risks  Outcome: Outcome Met, Continue evaluating goal progress toward completion  Goal: # Verbalizes understanding of fall risk/precautions  Description: Document education using the patient education activity  Outcome: Outcome Met, Continue evaluating goal progress toward completion     Problem: Pain  Goal: #Acceptable pain level achieved/maintained at rest using NRS/Faces  Description: This goal is used for patients who can self-report.  Acceptable means the level is at or below the identified comfort/function goal.  Outcome: Outcome Met, Continue evaluating goal progress toward completion  Goal: # Acceptable pain level achieved/maintained at rest using NRS/Faces without oversedation (opioid naive or PCA/Epidural infusion)  Description: This goal is used if Opioid-naïve or on PCA/Epidural Infusion.  Outcome: Outcome Met, Continue evaluating goal progress toward completion  Goal: # Acceptable pain level achieved/maintained with activity using NRS/Faces  Description: This goal is used for patients who can self-report and are not achieving acceptable pain control during activity.  Outcome: Outcome Met, Continue evaluating goal progress toward completion  Goal: Acceptable pain/comfort level is achieved/maintained at rest (based on Pain Behaviors Scale)  Description: This goal is used for patients who are not able to self-report pain and are assessed for pain using the Pain Behaviors Scale  Outcome: Outcome Met, Continue  Patient presents via [facility] from home with complaints of altered mental status since Friday. Patient with noted cellulitis to buttocks, strong smell with urination and cough productive with yellow sputum. Patient lives at home alone. Patient with family that comes and checks on him frequently. Change in ambulation and mental status since Friday. evaluating goal progress toward completion  Goal: Acceptable pain/comfort level is achieved/maintained at rest based on PAINAID scale (Dementia)  Description: This goal is used for patients who are not able to self-report pain, have dementia, and assessed using the PAINAD scale.  Outcome: Outcome Met, Continue evaluating goal progress toward completion  Goal: Acceptable pain/comfort level is achieved/maintained at rest (based on pediatric behavior tool: NIPS, NPASS, or FLACC)  Description: This goal is used for pediatric patients who are not able to self report pain.  Outcome: Outcome Met, Continue evaluating goal progress toward completion  Goal: # Verbalizes understanding of pain management  Description: Documented in Patient Education Activity  Outcome: Outcome Met, Continue evaluating goal progress toward completion

## 2022-04-03 NOTE — H&P
INTERNAL MEDICINE H&P/CONSULT    Subjective:     26-year-old male w/ hx of BPD and esophegeal dilation,  brought in by EMS. Most of the history is per grandson. Minimal history per patient who is not verbal very much at all. Patient appears to be may be oriented x1. Grandson states patient lives by himself and they check on him frequently. They noticed over the last 48 hours he has become increasingly weak with decreased ambulation. Less p.o. intake. He is regurgitating vomiting on occasion. He had one fall but did not strike his head. It was a witnessed fall. He did not complain of pain after the fall. Grandson states no fever chills or diarrhea. He has been urinating frequently. In reviewing his records, history reflux. Frequent issues with esophageal stenosis with dilations up until about 2 or 3 years ago. He has had pneumonia in the past.  Sharmin Goss states he does not think he is on any medications at this point but evidently was on some medications for prostatic hypertrophy in the past.  History of appendectomy as well. Patient denies to me any pain at the moment or shortness of breath. EMS called today because patient was not able to walk and has had some regurgitation and patient episodes of vomiting. He has had pneumonia in the past.  They also noticed increasing skin breakdown on his buttocks as well. Strong odor to urine. On further questioning, his spx are worse for 1 week. He became very weak to even stand now but previously he is fully ambulating. He cannot tolerate liquids.      Past Medical History:   Diagnosis Date    Constipation     occassional, manage with OTC PRN med    Esophageal stenosis     GERD (gastroesophageal reflux disease)     hx of, no current episodes    History of pneumonia 12/2013    X1    [place] (hard of hearing)     pt doesnt wear any hearing aids    Ill-defined condition     \"burning in feet\" per pt; occasional/episodic.- Treated at [facility] in [place], getting injection      Past Surgical History:   Procedure Laterality Date    HX APPENDECTOMY  age 19's    HX CATARACT REMOVAL Bilateral     HX COLONOSCOPY      HX ENDOSCOPY  11/2015      Prior to Admission medications    Medication Sig Start Date End Date Taking? Authorizing Provider   finasteride (PROSCAR) 5 mg tablet Take 5 mg by mouth daily. Indications: benign prostatic hyperplasia with lower urinary tract sx    Provider, Historical   doxazosin (CARDURA) 4 mg tablet Take 4 mg by mouth nightly. Indications: BENIGN PROSTATIC HYPERTROPHY    Provider, Historical     Allergies   Allergen Reactions    Chocolate Flavor [Flavoring Agent] Rash      Social History     Tobacco Use    Smoking status: Never Smoker    Smokeless tobacco: Never Used   Substance Use Topics    Alcohol use: No        Family History:  HTN    Review of Systems   Unobtainable dt mental/ medical status    Objective: Intake / Output:  No intake/output data recorded. No intake/output data recorded. Physical Exam:  Visit Vitals  /63   Pulse 84   Temp 98.7 °F (37.1 °C)   Resp 22   SpO2 96%     General appearance: awake, alert, cooperative, cachectic, moderate distress, appears stated age - Pale, No icterus, Dry mucous membranes  Head: Normocephalic, without obvious abnormality, atraumatic  Back: symmetric, no curvature. ROM normal. No CVA tenderness. Lungs: clear to auscultation bilaterally - Diminished bs bibasilar  Heart: regular rate and rhythm, S1, S2 normal, no murmur, click, rub or gallop. Abdomen: soft, no tenderness, no distension, normal bowel sound, no masses, no organomegaly  Extremities: atraumatic, no cyanosis - Bilateral lower limbs edema none.   Skin: superficial ulceration of R>L buttock  Neurologic: Grossly intact     ECG: sinus rhythm     Data Review (Labs):   Recent Results (from the past 24 hour(s))   LACTIC ACID    Collection Time: 04/03/22 10:37 AM   Result Value Ref Range    Lactic acid 1.8 0.4 - 2. 0 MMOL/L   CBC WITH AUTOMATED DIFF    Collection Time: 04/03/22 10:37 AM   Result Value Ref Range    WBC 9.4 4.3 - 11.1 K/uL    RBC 3.70 (L) 4.23 - 5.6 M/uL    HGB 11.9 (L) 13.6 - 17.2 g/dL    HCT 38.4 (L) 41.1 - 50.3 %    .8 (H) 79.6 - 97.8 FL    MCH 32.2 26.1 - 32.9 PG    MCHC 31.0 (L) 31.4 - 35.0 g/dL    RDW 13.2 11.9 - 14.6 %    PLATELET 299 135 - 106 K/uL    MPV 11.3 9.4 - 12.3 FL    ABSOLUTE NRBC 0.00 0.0 - 0.2 K/uL    DF AUTOMATED      NEUTROPHILS 57 43 - 78 %    LYMPHOCYTES 21 13 - 44 %    MONOCYTES 8 4.0 - 12.0 %    EOSINOPHILS 13 (H) 0.5 - 7.8 %    BASOPHILS 0 0.0 - 2.0 %    IMMATURE GRANULOCYTES 0 0.0 - 5.0 %    ABS. NEUTROPHILS 5.4 1.7 - 8.2 K/UL    ABS. LYMPHOCYTES 2.0 0.5 - 4.6 K/UL    ABS. MONOCYTES 0.8 0.1 - 1.3 K/UL    ABS. EOSINOPHILS 1.2 (H) 0.0 - 0.8 K/UL    ABS. BASOPHILS 0.0 0.0 - 0.2 K/UL    ABS. IMM. GRANS. 0.0 0.0 - 0.5 K/UL   PROCALCITONIN    Collection Time: 04/03/22 10:37 AM   Result Value Ref Range    Procalcitonin <0.05 0.00 - 7.25 ng/mL   METABOLIC PANEL, COMPREHENSIVE    Collection Time: 04/03/22 10:37 AM   Result Value Ref Range    Sodium 149 (H) 138 - 145 mmol/L    Potassium 3.6 3.5 - 5.1 mmol/L    Chloride 115 (H) 98 - 107 mmol/L    CO2 30 21 - 32 mmol/L    Anion gap 4 (L) 7 - 16 mmol/L    Glucose 198 (H) 65 - 100 mg/dL    BUN 42 (H) 8 - 23 MG/DL    Creatinine 1.60 (H) 0.8 - 1.5 MG/DL    GFR est AA 52 (L) >60 ml/min/1.73m2    GFR est non-AA 43 (L) >60 ml/min/1.73m2    Calcium 9.1 8.3 - 10.4 MG/DL    Bilirubin, total 0.6 0.2 - 1.1 MG/DL    ALT (SGPT) 35 12 - 65 U/L    AST (SGOT) 26 15 - 37 U/L    Alk.  phosphatase 65 50 - 136 U/L    Protein, total 7.5 6.3 - 8.2 g/dL    Albumin 2.5 (L) 3.2 - 4.6 g/dL    Globulin 5.0 (H) 2.3 - 3.5 g/dL    A-G Ratio 0.5 (L) 1.2 - 3.5     EKG, 12 LEAD, INITIAL    Collection Time: 04/03/22 10:42 AM   Result Value Ref Range    Ventricular Rate 89 BPM    Atrial Rate 89 BPM    P-R Interval 164 ms    QRS Duration 142 ms    Q-T Interval 448 ms    QTC Calculation (Bezet) 546 ms    Calculated P Axis 67 degrees    Calculated R Axis -131 degrees    Calculated T Axis 46 degrees    Diagnosis       Sinus rhythm  Right bundle branch block  Anteroseptal infarct, age indeterminate     COVID-19 RAPID TEST    Collection Time: 04/03/22 12:27 PM   Result Value Ref Range    Specimen source NASAL      COVID-19 rapid test Not detected NOTD     URINALYSIS W/ RFLX MICROSCOPIC    Collection Time: 04/03/22 12:45 PM   Result Value Ref Range    Color YELLOW      Appearance CLOUDY      Specific gravity 1.010 1.001 - 1.023      pH (UA) 8.0 5.0 - 9.0      Protein TRACE (A) NEG mg/dL    Glucose Negative mg/dL    Ketone Negative NEG mg/dL    Bilirubin Negative NEG      Blood LARGE (A) NEG      Urobilinogen 0.2 0.2 - 1.0 EU/dL    Nitrites Positive (A) NEG      Leukocyte Esterase LARGE (A) NEG     URINE MICROSCOPIC    Collection Time: 04/03/22 12:45 PM   Result Value Ref Range    WBC 0-3 0 /hpf    RBC 20-50 0 /hpf    Epithelial cells 0-3 0 /hpf    Bacteria TRACE 0 /hpf    Casts 0 0 /lpf    Crystals, urine 0 0 /LPF    Mucus 1+ (H) 0 /lpf       Assessment:     1- CATE (acute kidney injury), non-oliguric, likely prerenal, a/w vomiting  2- Vomiting and dehydration, has hx of esophegeal stricture and dilation  3- Acute complicated UTI in male, hx of BPH  4- Hypernatremia, hypovolemic  5- Aspiration pneumonitis, bibasilar. Plan:     Rocephin IV  Follow cultures  Gi consulted  PT/ OT consulted  May need rehab  PPD placed  NPO  IVF hydration, gentle  AM lab  Continue essential home medications. Patient is full code. Further management depends on patient progress. Thank you for the oppourtinity to contribute in the care of your patient.     Signed By: Lorraine Edwards MD     April 3, 2022

## 2022-04-03 NOTE — ED PROVIDER NOTES
71-year-old male brought in by EMS. Most of the history is per grandson. Minimal history per patient who is not verbal very much at all. Patient appears to be may be oriented x1. Grandson states patient lives by himself and they check on him frequently. They noticed over the last 48 hours he has become increasingly weak with decreased ambulation. Less p.o. intake. He is regurgitating vomiting on occasion. He had one fall but did not strike his head. It was a witnessed fall. He did not complain of pain after the fall. Grandson states no fever chills or diarrhea. He has been urinating frequently. In reviewing his records, history reflux. Frequent issues with esophageal stenosis with dilations up until about 2 or 3 years ago. He has had pneumonia in the past.  Ryan Quiroz states he does not think he is on any medications at this point but evidently was on some medications for prostatic hypertrophy in the past.  History of appendectomy as well. Patient denies to me any pain at the moment or shortness of breath. EMS called today because patient was not able to walk and has had some regurgitation and patient episodes of vomiting. He has had pneumonia in the past.  They also noticed increasing skin breakdown on his buttocks as well. Strong odor to urine. The history is provided by the patient, a relative and the EMS personnel. Skin Infection  This is a new problem. The current episode started yesterday. The problem occurs constantly. The problem has not changed since onset. Pertinent negatives include no chest pain, no abdominal pain, no headaches and no shortness of breath. Nothing relieves the symptoms. Altered mental status   This is a new problem. The current episode started 2 days ago. The problem has been gradually worsening. Associated symptoms include somnolence and weakness. Mental status baseline is mild dementia.   His past medical history does not include diabetes, seizures, CVA, hypertension, COPD, head trauma or heart disease. Past Medical History:   Diagnosis Date    Constipation     occassional, manage with OTC PRN med    Esophageal stenosis     GERD (gastroesophageal reflux disease)     hx of, no current episodes    History of pneumonia 12/2013    X1    [place] (hard of hearing)     pt doesnt wear any hearing aids    Ill-defined condition     \"burning in feet\" per pt; occasional/episodic.- Treated at [facility] in [place], getting injection       Past Surgical History:   Procedure Laterality Date    HX APPENDECTOMY  age 19's   Tomie Grater HX CATARACT REMOVAL Bilateral     HX COLONOSCOPY      HX ENDOSCOPY  11/2015         Family History:   Problem Relation Age of Onset    No Known Problems Father     Heart Attack Mother     Heart Disease Mother     No Known Problems Sister     No Known Problems Brother     Cancer Sister     No Known Problems Sister     Heart Attack Brother     No Known Problems Brother     No Known Problems Brother     No Known Problems Brother        Social History     Socioeconomic History    Marital status:      Spouse name: Not on file    Number of children: Not on file    Years of education: Not on file    Highest education level: Not on file   Occupational History    Not on file   Tobacco Use    Smoking status: Never Smoker    Smokeless tobacco: Never Used   Substance and Sexual Activity    Alcohol use: No    Drug use: No    Sexual activity: Not Currently   Other Topics Concern    Not on file   Social History Narrative    Not on file     Social Determinants of Health     Financial Resource Strain:     Difficulty of Paying Living Expenses: Not on file   Food Insecurity:     Worried About Running Out of Food in the Last Year: Not on file    Jimmy of Food in the Last Year: Not on file   Transportation Needs:     Lack of Transportation (Medical): Not on file    Lack of Transportation (Non-Medical):  Not on file   Physical Activity:     Days of Exercise per Week: Not on file    Minutes of Exercise per Session: Not on file   Stress:     Feeling of Stress : Not on file   Social Connections:     Frequency of Communication with Friends and Family: Not on file    Frequency of Social Gatherings with Friends and Family: Not on file    Attends [employer] Services: Not on file    Active Member of [address] [place] [address] or Organizations: Not on file    Attends Club or Organization Meetings: Not on file    Marital Status: Not on file   Intimate Partner Violence:     Fear of Current or Ex-Partner: Not on file    Emotionally Abused: Not on file    Physically Abused: Not on file    Sexually Abused: Not on file   Housing Stability:     Unable to Pay for Housing in the Last Year: Not on file    Number of [place] in the Last Year: Not on file    Unstable Housing in the Last Year: Not on file         ALLERGIES: Chocolate flavor [flavoring agent]    Review of Systems   Unable to perform ROS: Mental status change   Respiratory: Negative for shortness of breath. Cardiovascular: Negative for chest pain. Gastrointestinal: Negative for abdominal pain. Neurological: Positive for weakness. Negative for headaches. Vitals:    04/03/22 1040 04/03/22 1041   BP: 104/66    Pulse: 90    Resp: 18    Temp: 98.2 °F (36.8 °C)    SpO2: 96% 96%            Physical Exam  Vitals and nursing note reviewed. Constitutional:       General: He is not in acute distress. Appearance: He is ill-appearing. Comments: Thin. Very rare yes or no questions answered   HENT:      Head: Normocephalic and atraumatic. Right Ear: External ear normal.      Left Ear: External ear normal.      Mouth/Throat:      Mouth: Mucous membranes are dry. Pharynx: No oropharyngeal exudate. Eyes:      General: No scleral icterus. Conjunctiva/sclera: Conjunctivae normal.      Pupils: Pupils are equal, round, and reactive to light.    Cardiovascular:      Rate and Rhythm: Normal rate and regular rhythm. Heart sounds: No murmur heard. Pulmonary:      Effort: No respiratory distress. Breath sounds: Normal breath sounds. Abdominal:      General: Bowel sounds are normal.      Palpations: Abdomen is soft. There is no mass. Tenderness: There is no abdominal tenderness. There is no guarding or rebound. Hernia: No hernia is present. Genitourinary:     Comments: Pure wick covering groin area. No obvious swelling. Musculoskeletal:      Cervical back: Normal range of motion and neck supple. Right lower leg: No edema. Left lower leg: No edema. Skin:     General: Skin is warm and dry. Comments: Skin breakdown into the dermis but not into subcutaneous tissues over the buttocks. Some erythema slight sloughing. Some areas are slightly moist others are dry. No purulence or crepitus   Neurological:      General: No focal deficit present. Mental Status: He is alert. GCS: GCS eye subscore is 4. GCS verbal subscore is 4. GCS motor subscore is 6. Cranial Nerves: No facial asymmetry. Motor: No weakness. Comments: Poor speech. No facial asymmetry. Equal . Does not really cooperate for formal neuro exam but appears to have good strength in all 4 extremities. Able to turn over by himself in bed. Able to pull himself to sitting. Psychiatric:         Speech: Speech normal.          MDM  Number of Diagnoses or Management Options  Diagnosis management comments: Decreased mental status and strength in patient with issues of prostatic hypertrophy, regurgitation esophageal stenosis. Concern for infection and dehydration. Screen for sepsis. Check urinalysis and chest x-ray. Source of infection could be cellulitis of the buttock. Check EKG as well.   Fluid bolus       Amount and/or Complexity of Data Reviewed  Clinical lab tests: ordered and reviewed  Tests in the radiology section of CPT®: ordered and reviewed  Tests in the medicine section of CPT®: reviewed and ordered  Obtain history from someone other than the patient: yes  Review and summarize past medical records: yes  Independent visualization of images, tracings, or specimens: yes (My interpretation EKG shows normal sinus rhythm at 90. No ST changes. Nonspecific T changes. Right bundle branch block. No ectopy. Normal QT interval)    Risk of Complications, Morbidity, and/or Mortality  Presenting problems: moderate  Diagnostic procedures: minimal  Management options: low    Patient Progress  Patient progress: stable         Procedures    Results Include:    Recent Results (from the past 24 hour(s))   LACTIC ACID    Collection Time: 04/03/22 10:37 AM   Result Value Ref Range    Lactic acid 1.8 0.4 - 2.0 MMOL/L   CBC WITH AUTOMATED DIFF    Collection Time: 04/03/22 10:37 AM   Result Value Ref Range    WBC 9.4 4.3 - 11.1 K/uL    RBC 3.70 (L) 4.23 - 5.6 M/uL    HGB 11.9 (L) 13.6 - 17.2 g/dL    HCT 38.4 (L) 41.1 - 50.3 %    .8 (H) 79.6 - 97.8 FL    MCH 32.2 26.1 - 32.9 PG    MCHC 31.0 (L) 31.4 - 35.0 g/dL    RDW 13.2 11.9 - 14.6 %    PLATELET 331 260 - 316 K/uL    MPV 11.3 9.4 - 12.3 FL    ABSOLUTE NRBC 0.00 0.0 - 0.2 K/uL    DF AUTOMATED      NEUTROPHILS 57 43 - 78 %    LYMPHOCYTES 21 13 - 44 %    MONOCYTES 8 4.0 - 12.0 %    EOSINOPHILS 13 (H) 0.5 - 7.8 %    BASOPHILS 0 0.0 - 2.0 %    IMMATURE GRANULOCYTES 0 0.0 - 5.0 %    ABS. NEUTROPHILS 5.4 1.7 - 8.2 K/UL    ABS. LYMPHOCYTES 2.0 0.5 - 4.6 K/UL    ABS. MONOCYTES 0.8 0.1 - 1.3 K/UL    ABS. EOSINOPHILS 1.2 (H) 0.0 - 0.8 K/UL    ABS. BASOPHILS 0.0 0.0 - 0.2 K/UL    ABS. IMM.  GRANS. 0.0 0.0 - 0.5 K/UL   PROCALCITONIN    Collection Time: 04/03/22 10:37 AM   Result Value Ref Range    Procalcitonin <0.05 0.00 - 8.25 ng/mL   METABOLIC PANEL, COMPREHENSIVE    Collection Time: 04/03/22 10:37 AM   Result Value Ref Range    Sodium 149 (H) 138 - 145 mmol/L    Potassium 3.6 3.5 - 5.1 mmol/L    Chloride 115 (H) 98 - 107 mmol/L CO2 30 21 - 32 mmol/L    Anion gap 4 (L) 7 - 16 mmol/L    Glucose 198 (H) 65 - 100 mg/dL    BUN 42 (H) 8 - 23 MG/DL    Creatinine 1.60 (H) 0.8 - 1.5 MG/DL    GFR est AA 52 (L) >60 ml/min/1.73m2    GFR est non-AA 43 (L) >60 ml/min/1.73m2    Calcium 9.1 8.3 - 10.4 MG/DL    Bilirubin, total 0.6 0.2 - 1.1 MG/DL    ALT (SGPT) 35 12 - 65 U/L    AST (SGOT) 26 15 - 37 U/L    Alk.  phosphatase 65 50 - 136 U/L    Protein, total 7.5 6.3 - 8.2 g/dL    Albumin 2.5 (L) 3.2 - 4.6 g/dL    Globulin 5.0 (H) 2.3 - 3.5 g/dL    A-G Ratio 0.5 (L) 1.2 - 3.5     EKG, 12 LEAD, INITIAL    Collection Time: 04/03/22 10:42 AM   Result Value Ref Range    Ventricular Rate 89 BPM    Atrial Rate 89 BPM    P-R Interval 164 ms    QRS Duration 142 ms    Q-T Interval 448 ms    QTC Calculation (Bezet) 546 ms    Calculated P Axis 67 degrees    Calculated R Axis -131 degrees    Calculated T Axis 46 degrees    Diagnosis       Sinus rhythm  Right bundle branch block  Anteroseptal infarct, age indeterminate     COVID-19 RAPID TEST    Collection Time: 04/03/22 12:27 PM   Result Value Ref Range    Specimen source NASAL      COVID-19 rapid test Not detected NOTD     URINALYSIS W/ RFLX MICROSCOPIC    Collection Time: 04/03/22 12:45 PM   Result Value Ref Range    Color YELLOW      Appearance CLOUDY      Specific gravity 1.010 1.001 - 1.023      pH (UA) 8.0 5.0 - 9.0      Protein TRACE (A) NEG mg/dL    Glucose Negative mg/dL    Ketone Negative NEG mg/dL    Bilirubin Negative NEG      Blood LARGE (A) NEG      Urobilinogen 0.2 0.2 - 1.0 EU/dL    Nitrites Positive (A) NEG      Leukocyte Esterase LARGE (A) NEG     URINE MICROSCOPIC    Collection Time: 04/03/22 12:45 PM   Result Value Ref Range    WBC 0-3 0 /hpf    RBC 20-50 0 /hpf    Epithelial cells 0-3 0 /hpf    Bacteria TRACE 0 /hpf    Casts 0 0 /lpf    Crystals, urine 0 0 /LPF    Mucus 1+ (H) 0 /lpf     XR CHEST PORT    Result Date: 4/3/2022  History: Productive cough Exam: portable chest Comparison: 12/20/2012 Findings: No focal alveolar infiltrate or pleural effusion. No change in the appearance of the mediastinal contour or osseous structures. IMPRESSIONs: No acute findings. My review of chest x-ray reveals what I believe some increased markings at the base that may rest resent some aspiration. Patient certainly has cellulitis on the buttocks and skin breakdown. Also acute kidney injury as his creatinine is doubled over previous value which was a few years ago. Too weak to walk. Patient needs IV fluids. Has been given antibiotics. No evidence of severe sepsis. Notified hospitalist regarding admission. Spoke with patient's family.

## 2022-04-03 NOTE — PROGRESS NOTES
04/03/22 1520   Dual Skin Pressure Injury Assessment   Dual Skin Pressure Injury Assessment X   Second Care Provider (Based on [address] [place] [address]) Lia Krueger RN   Buttocks/Ishium  Bilateral   pt has severe loose flaky skin on bilateral buttocks. all skin is severely dry. Bilateral great toenails are extremely long and discolored. Skin on sacrum and heels are intact.

## 2022-04-04 ENCOUNTER — ANESTHESIA EVENT (OUTPATIENT)
Dept: ENDOSCOPY | Age: 87
DRG: 391 | End: 2022-04-04
Payer: MEDICARE

## 2022-04-04 ENCOUNTER — ANESTHESIA (OUTPATIENT)
Dept: ENDOSCOPY | Age: 87
DRG: 391 | End: 2022-04-04
Payer: MEDICARE

## 2022-04-04 PROBLEM — N39.0 UTI (URINARY TRACT INFECTION): Status: ACTIVE | Noted: 2022-04-04

## 2022-04-04 PROBLEM — K22.2 ESOPHAGEAL STRICTURE: Status: ACTIVE | Noted: 2022-04-04

## 2022-04-04 PROBLEM — E87.0 HYPERNATREMIA: Status: ACTIVE | Noted: 2022-04-04

## 2022-04-04 PROBLEM — I95.9 HYPOTENSION: Status: ACTIVE | Noted: 2022-04-04

## 2022-04-04 LAB
ANION GAP SERPL CALC-SCNC: 8 MMOL/L (ref 7–16)
BUN SERPL-MCNC: 42 MG/DL (ref 8–23)
CALCIUM SERPL-MCNC: 8.6 MG/DL (ref 8.3–10.4)
CHLORIDE SERPL-SCNC: 118 MMOL/L (ref 98–107)
CO2 SERPL-SCNC: 26 MMOL/L (ref 21–32)
CREAT SERPL-MCNC: 1.5 MG/DL (ref 0.8–1.5)
ERYTHROCYTE [DISTWIDTH] IN BLOOD BY AUTOMATED COUNT: 13.2 % (ref 11.9–14.6)
GLUCOSE SERPL-MCNC: 149 MG/DL (ref 65–100)
HCT VFR BLD AUTO: 33.6 % (ref 41.1–50.3)
HGB BLD-MCNC: 10.5 G/DL (ref 13.6–17.2)
MCH RBC QN AUTO: 32 PG (ref 26.1–32.9)
MCHC RBC AUTO-ENTMCNC: 31.3 G/DL (ref 31.4–35)
MCV RBC AUTO: 102.4 FL (ref 79.6–97.8)
MM INDURATION POC: 0 MM (ref 0–5)
NRBC # BLD: 0 K/UL (ref 0–0.2)
PLATELET # BLD AUTO: 286 K/UL (ref 150–450)
PMV BLD AUTO: 11.2 FL (ref 9.4–12.3)
POTASSIUM SERPL-SCNC: 3.7 MMOL/L (ref 3.5–5.1)
PPD POC: NEGATIVE NEGATIVE
RBC # BLD AUTO: 3.28 M/UL (ref 4.23–5.6)
SODIUM SERPL-SCNC: 151 MMOL/L (ref 138–145)
SODIUM SERPL-SCNC: 152 MMOL/L (ref 138–145)
WBC # BLD AUTO: 9.3 K/UL (ref 4.3–11.1)

## 2022-04-04 PROCEDURE — 74011000250 HC RX REV CODE- 250: Performed by: FAMILY MEDICINE

## 2022-04-04 PROCEDURE — 74011000250 HC RX REV CODE- 250: Performed by: HOSPITALIST

## 2022-04-04 PROCEDURE — 74011000250 HC RX REV CODE- 250: Performed by: REGISTERED NURSE

## 2022-04-04 PROCEDURE — 74011250636 HC RX REV CODE- 250/636: Performed by: FAMILY MEDICINE

## 2022-04-04 PROCEDURE — 76060000031 HC ANESTHESIA FIRST 0.5 HR: Performed by: INTERNAL MEDICINE

## 2022-04-04 PROCEDURE — 77030040393 HC DRSG OPTIFOAM GENT MDII -B

## 2022-04-04 PROCEDURE — 74011250637 HC RX REV CODE- 250/637: Performed by: FAMILY MEDICINE

## 2022-04-04 PROCEDURE — 0DJ08ZZ INSPECTION OF UPPER INTESTINAL TRACT, VIA NATURAL OR ARTIFICIAL OPENING ENDOSCOPIC: ICD-10-PCS | Performed by: INTERNAL MEDICINE

## 2022-04-04 PROCEDURE — 85027 COMPLETE CBC AUTOMATED: CPT

## 2022-04-04 PROCEDURE — 97161 PT EVAL LOW COMPLEX 20 MIN: CPT

## 2022-04-04 PROCEDURE — 97530 THERAPEUTIC ACTIVITIES: CPT

## 2022-04-04 PROCEDURE — 74011250637 HC RX REV CODE- 250/637: Performed by: HOSPITALIST

## 2022-04-04 PROCEDURE — 74011250637 HC RX REV CODE- 250/637: Performed by: INTERNAL MEDICINE

## 2022-04-04 PROCEDURE — 65270000029 HC RM PRIVATE

## 2022-04-04 PROCEDURE — 36415 COLL VENOUS BLD VENIPUNCTURE: CPT

## 2022-04-04 PROCEDURE — 97162 PT EVAL MOD COMPLEX 30 MIN: CPT

## 2022-04-04 PROCEDURE — 74011250636 HC RX REV CODE- 250/636: Performed by: REGISTERED NURSE

## 2022-04-04 PROCEDURE — 97112 NEUROMUSCULAR REEDUCATION: CPT

## 2022-04-04 PROCEDURE — 84295 ASSAY OF SERUM SODIUM: CPT

## 2022-04-04 PROCEDURE — 2709999900 HC NON-CHARGEABLE SUPPLY: Performed by: INTERNAL MEDICINE

## 2022-04-04 PROCEDURE — 76040000025: Performed by: INTERNAL MEDICINE

## 2022-04-04 PROCEDURE — 80048 BASIC METABOLIC PNL TOTAL CA: CPT

## 2022-04-04 PROCEDURE — 2709999900 HC NON-CHARGEABLE SUPPLY

## 2022-04-04 PROCEDURE — 97165 OT EVAL LOW COMPLEX 30 MIN: CPT

## 2022-04-04 RX ORDER — HEPARIN SODIUM 5000 [USP'U]/ML
5000 INJECTION, SOLUTION INTRAVENOUS; SUBCUTANEOUS EVERY 12 HOURS
Status: DISCONTINUED | OUTPATIENT
Start: 2022-04-04 | End: 2022-04-05 | Stop reason: HOSPADM

## 2022-04-04 RX ORDER — PANTOPRAZOLE SODIUM 40 MG/1
40 TABLET, DELAYED RELEASE ORAL
Status: DISCONTINUED | OUTPATIENT
Start: 2022-04-04 | End: 2022-04-05 | Stop reason: HOSPADM

## 2022-04-04 RX ORDER — PROPOFOL 10 MG/ML
INJECTION, EMULSION INTRAVENOUS
Status: DISCONTINUED | OUTPATIENT
Start: 2022-04-04 | End: 2022-04-04 | Stop reason: HOSPADM

## 2022-04-04 RX ORDER — MIDODRINE HYDROCHLORIDE 5 MG/1
5 TABLET ORAL ONCE
Status: COMPLETED | OUTPATIENT
Start: 2022-04-04 | End: 2022-04-04

## 2022-04-04 RX ORDER — DEXTROSE MONOHYDRATE 50 MG/ML
75 INJECTION, SOLUTION INTRAVENOUS CONTINUOUS
Status: DISCONTINUED | OUTPATIENT
Start: 2022-04-04 | End: 2022-04-05 | Stop reason: HOSPADM

## 2022-04-04 RX ORDER — LIDOCAINE HYDROCHLORIDE 20 MG/ML
INJECTION, SOLUTION EPIDURAL; INFILTRATION; INTRACAUDAL; PERINEURAL AS NEEDED
Status: DISCONTINUED | OUTPATIENT
Start: 2022-04-04 | End: 2022-04-04 | Stop reason: HOSPADM

## 2022-04-04 RX ORDER — SODIUM CHLORIDE, SODIUM LACTATE, POTASSIUM CHLORIDE, CALCIUM CHLORIDE 600; 310; 30; 20 MG/100ML; MG/100ML; MG/100ML; MG/100ML
INJECTION, SOLUTION INTRAVENOUS
Status: DISCONTINUED | OUTPATIENT
Start: 2022-04-04 | End: 2022-04-04 | Stop reason: HOSPADM

## 2022-04-04 RX ORDER — PROPOFOL 10 MG/ML
INJECTION, EMULSION INTRAVENOUS AS NEEDED
Status: DISCONTINUED | OUTPATIENT
Start: 2022-04-04 | End: 2022-04-04 | Stop reason: HOSPADM

## 2022-04-04 RX ORDER — LANOLIN ALCOHOL/MO/W.PET/CERES
100 CREAM (GRAM) TOPICAL DAILY
Status: DISCONTINUED | OUTPATIENT
Start: 2022-04-04 | End: 2022-04-05 | Stop reason: HOSPADM

## 2022-04-04 RX ADMIN — SODIUM CHLORIDE, PRESERVATIVE FREE 10 ML: 5 INJECTION INTRAVENOUS at 15:13

## 2022-04-04 RX ADMIN — HEPARIN SODIUM 5000 UNITS: 5000 INJECTION INTRAVENOUS; SUBCUTANEOUS at 14:30

## 2022-04-04 RX ADMIN — SODIUM CHLORIDE, PRESERVATIVE FREE 10 ML: 5 INJECTION INTRAVENOUS at 21:26

## 2022-04-04 RX ADMIN — PHENYLEPHRINE HYDROCHLORIDE 100 MCG: 10 INJECTION INTRAVENOUS at 12:45

## 2022-04-04 RX ADMIN — SODIUM CHLORIDE, PRESERVATIVE FREE 10 ML: 5 INJECTION INTRAVENOUS at 05:46

## 2022-04-04 RX ADMIN — PROPOFOL 30 MG: 10 INJECTION, EMULSION INTRAVENOUS at 12:40

## 2022-04-04 RX ADMIN — SODIUM CHLORIDE, PRESERVATIVE FREE 10 ML: 5 INJECTION INTRAVENOUS at 15:12

## 2022-04-04 RX ADMIN — SODIUM CHLORIDE 250 ML: 450 INJECTION, SOLUTION INTRAVENOUS at 08:00

## 2022-04-04 RX ADMIN — VANCOMYCIN HYDROCHLORIDE 750 MG: 750 INJECTION, POWDER, LYOPHILIZED, FOR SOLUTION INTRAVENOUS at 15:16

## 2022-04-04 RX ADMIN — LIDOCAINE HYDROCHLORIDE 40 MG: 20 INJECTION, SOLUTION EPIDURAL; INFILTRATION; INTRACAUDAL; PERINEURAL at 12:40

## 2022-04-04 RX ADMIN — DEXTROSE MONOHYDRATE 75 ML/HR: 5 INJECTION, SOLUTION INTRAVENOUS at 08:35

## 2022-04-04 RX ADMIN — PANTOPRAZOLE SODIUM 40 MG: 40 TABLET, DELAYED RELEASE ORAL at 14:31

## 2022-04-04 RX ADMIN — SODIUM CHLORIDE, PRESERVATIVE FREE 10 ML: 5 INJECTION INTRAVENOUS at 05:47

## 2022-04-04 RX ADMIN — PROPOFOL 100 MCG/KG/MIN: 10 INJECTION, EMULSION INTRAVENOUS at 12:40

## 2022-04-04 RX ADMIN — SODIUM CHLORIDE, SODIUM LACTATE, POTASSIUM CHLORIDE, AND CALCIUM CHLORIDE: 600; 310; 30; 20 INJECTION, SOLUTION INTRAVENOUS at 12:39

## 2022-04-04 RX ADMIN — MIDODRINE HYDROCHLORIDE 5 MG: 5 TABLET ORAL at 08:44

## 2022-04-04 RX ADMIN — FINASTERIDE 5 MG: 5 TABLET, FILM COATED ORAL at 08:45

## 2022-04-04 RX ADMIN — Medication 100 MG: at 17:29

## 2022-04-04 NOTE — PROGRESS NOTES
ACUTE PHYSICAL THERAPY GOALS:  (Developed with and agreed upon by patient and/or caregiver.)    (1.) Michelle Patterson  will move from supine to sit and sit to supine  with INDEPENDENCE within 7 treatment day(s). (2.) Michelle Patterson will transfer from bed to chair and chair to bed with MINIMAL ASSIST using the least restrictive device within 7 treatment day(s). (3.) Michelle Patterson will ambulate with MINIMAL ASSIST for 50 feet with the least restrictive device within 7 treatment day(s). (4.) Michelle Patterson will perform standing static and dynamic balance activities x 10 minutes with SUPERVISION to improve safety within 7 treatment day(s). (5.) Michelle Patterson will ascend and descend 1 stairs using 1 hand rail(s) with MINIMAL ASSIST to improve functional mobility and safety within 7 treatment day(s). (6.) Michelle Patterson will perform bilateral lower extremity exercises x 20 min for HEP with INDEPENDENCE to improve strength, endurance, and functional mobility within 7 treatment day(s).      PHYSICAL THERAPY ASSESSMENT: Initial Assessment, Daily Note and AM PT Treatment Day # 1      Michelle Patterson is a 80 y.o. male   PRIMARY DIAGNOSIS: <principal problem not specified>  [facility] [N17.9]  Procedure(s) (LRB):  ESOPHAGOGASTRODUODENOSCOPY (EGD) *Rm 610 Transport at 1130 Sodium recheck (N/A)  Day of Surgery  Reason for Referral:    ICD-10: Treatment Diagnosis: Difficulty in walking, Not elsewhere classified (R26.2)  [place] / [place] / Product Type: Medicare /     ASSESSMENT:     REHAB RECOMMENDATIONS:   Recommendation to date pending progress:  Setting:   Short-term Rehab  Equipment:    To Be Determined     PRIOR LEVEL OF FUNCTION:  (Prior to Hospitalization) INITIAL/CURRENT LEVEL OF FUNCTION:  (Most Recently Demonstrated)   Bed Mobility:   Independent  Sit to Stand:   Independent  Transfers:   Independent  Gait/Mobility:   Independent Bed Mobility:   Moderate Assistance x 2  Sit to Stand:   Moderate Assistance x 2  Transfers:   Not tested  Gait/Mobility:   Moderate Assistance x 2     ASSESSMENT:  Mr. Valencia Kurtz  is a 80year old M who presents AMS. Pt with CATE, cellulitis to buttocks, weakness, and gait disturbances. At baseline, pt is independent with mobility but always has family checking on him, assisting with ADL's. Today pt is oriented to self only. This date pt performs mobility including bed mobility with modA of 2. He was able to stand and take a few side steps with RW and modA of 2. Frequent cues for safety and balance, pt fatigues easily. Pt presents as functioning below his baseline, with deficits in mobility including transfers, gait, balance, and activity tolerance. Pt will benefit from skilled therapy services to address stated deficits to promote return to highest level of function, independence, and safety. Will continue to follow. PM Update: family electing to go home with hospice care.       SUBJECTIVE:   Mr. Valencia Kurtz states, \"yes\"    SOCIAL HISTORY/LIVING ENVIRONMENT: PLOF: ambulates without DME, lives alone but family is constantly rotating checking on him, 1 fall     OBJECTIVE:     PAIN: VITAL SIGNS: LINES/DRAINS:   Pre Treatment: Pain Screen  Pain Scale 1: Numeric (0 - 10)  Pain Intensity 1: 0  Post Treatment: 0   Purewick  O2 Device: None (Room air)     GROSS EVALUATION:  B LE Within Functional Limits Abnormal/ Functional Abnormal/ Non-Functional (see comments) Not Tested Comments:   AROM [] [x] [] []    PROM [] [] [] [x]    Strength [] [x] [] []    Balance [] [x] [] [] Fair sitting, poor standing    Posture [] [x] [] [] Rounded shoulders, forward head   Sensation [] [] [] [x]    Coordination [] [] [] [x]    Tone [] [] [] [x]    Edema [] [] [] [x]    Activity Tolerance [] [x] [] [] Well below baseline     [] [] [] []      COGNITION/  PERCEPTION: Intact Impaired   (see comments) Comments:   Orientation [] [x]    Vision [] [] Hearing [] []    Command Following [] [x] Requires repetition and redirection   Safety Awareness [] [x] confusion    [] []      MOBILITY: I Mod I S SBA CGA Min Mod Max Total  NT x2 Comments:   Bed Mobility    Rolling [] [] [] [] [] [] [x] [] [] [] [x]    Supine to Sit [] [] [] [] [] [] [x] [] [] [] [x]    Scooting [] [] [] [] [] [] [x] [] [] [] [x]    Sit to Supine [] [] [] [] [] [] [x] [] [] [] [x]    Transfers    Sit to Stand [] [] [] [] [] [] [x] [] [] [] [x]    Bed to Chair [] [] [] [] [] [] [] [] [] [x] []    Stand to Sit [] [] [] [] [] [] [x] [] [] [] [x]    I=Independent, Mod I=Modified Independent, S=Supervision, SBA=Standby Assistance, CGA=Contact Guard Assistance,   Min=Minimal Assistance, Mod=Moderate Assistance, Max=Maximal Assistance, Total=Total Assistance, NT=Not Tested  GAIT: I Mod I S SBA CGA Min Mod Max Total  NT x2 Comments:   Level of Assistance [] [] [] [] [] [] [x] [] [] [] [x]    Distance A few side steps    DME Rolling Walker and Gait Belt    Gait Quality Unsteady, shuffled steps    Weightbearing Status N/A     I=Independent, Mod I=Modified Independent, S=Supervision, SBA=Standby Assistance, CGA=Contact Guard Assistance,   Min=Minimal Assistance, Mod=Moderate Assistance, Max=Maximal Assistance, Total=Total Assistance, NT=Not Tested    [address] [facility] [address] [address] [facility] [address] [facility] Mobility Inpatient Short Form       How much difficulty does the patient currently have. .. Unable A Lot A Little None   1. Turning over in bed (including adjusting bedclothes, sheets and blankets)? [] 1   [x] 2   [] 3   [] 4   2. Sitting down on and standing up from a chair with arms ( e.g., wheelchair, bedside commode, etc.)   [] 1   [x] 2   [] 3   [] 4   3. Moving from lying on back to sitting on the side of the bed? [] 1   [x] 2   [] 3   [] 4   How much help from another person does the patient currently need. .. Total A Lot A Little None   4. Moving to and from a bed to a chair (including a wheelchair)? [] 1   [x] 2   [] 3   [] 4   5. Need to walk in hospital room? [x] 1   [] 2   [] 3   [] 4   6. Climbing 3-5 steps with a railing? [x] 1   [] 2   [] 3   [] 4   © 2007, Trustees of [address] [place] [id number], under license to [employer]. All rights reserved     Score:  Initial: 10 Most Recent: X (Date: -- )    Interpretation of Tool:  Represents activities that are increasingly more difficult (i.e. Bed mobility, Transfers, Gait). PLAN:   FREQUENCY/DURATION: PT Plan of Care: 3 times/week for duration of hospital stay or until stated goals are met, whichever comes first.    PROBLEM LIST:   (Skilled intervention is medically necessary to address:)  1. Decreased ADL/Functional Activities  2. Decreased Activity Tolerance  3. Decreased AROM/PROM  4. Decreased Balance  5. Decreased Cognition  6. Decreased Gait Ability  7. Decreased Strength  8. Decreased Transfer Abilities   INTERVENTIONS PLANNED:   (Benefits and precautions of physical therapy have been discussed with the patient.)  1. Therapeutic Activity  2. Therapeutic Exercise/HEP  3. Neuromuscular Re-education  4. Gait Training  5. Education     TREATMENT:     EVALUATION: Moderate Complexity : (Untimed Charge)    TREATMENT:   ($$ Therapeutic Activity: 8-22 mins    )  Co-Treatment PT/OT necessary due to patient's decreased overall endurance/tolerance levels, as well as need for high level skilled assistance to complete functional transfers/mobility and functional tasks  Therapeutic Activity (17 Minutes): Therapeutic activity included Rolling, Supine to Sit, Sit to Supine, Scooting, Ambulation on level ground, Sitting balance  and Standing balance to improve functional Mobility, Strength and Activity tolerance.     TREATMENT GRID:  N/A    AFTER TREATMENT POSITION/PRECAUTIONS:  Alarm Activated, Bed, Needs within reach, RN notified and Visitors at bedside    INTERDISCIPLINARY COLLABORATION:  RN/PCT    TOTAL TREATMENT DURATION:  PT Patient Time In/Time Out  Time In: 3159  Time Out: 1201 [facility],

## 2022-04-04 NOTE — PROGRESS NOTES
TRANSFER - IN REPORT:    Verbal report received from Chava Segundo RN(name) on [address]  being received from 610(unit) for ordered procedure      Report consisted of patients Situation, Background, Assessment and   Recommendations(SBAR). Information from the following report(s) SBAR and Kardex was reviewed with the receiving nurse. Opportunity for questions and clarification was provided. Assessment completed upon patients arrival to unit and care assumed.

## 2022-04-04 NOTE — PROGRESS NOTES
VANCO DAILY FOLLOW UP RENAL INSUFFICIENCY PATIENT   [address] [facility] Pharmacokinetic Monitoring Service - Vancomycin    Consulting Provider: Dr. Pedro Pérez   Indication: UTI  Target Concentration: Random level ? 15 mg/L  Day of Therapy: 1/5  Additional Antimicrobials: none    Pertinent Laboratory Values: Wt Readings from Last 1 Encounters:   04/04/22 54.4 kg (120 lb)     Temp Readings from Last 1 Encounters:   04/04/22 98 °F (36.7 °C)     Recent Labs     04/04/22  0532 04/03/22  1037   BUN 42* 42*   CREA 1.50 1.60*   WBC 9.3 9.4   PCT  --  <0.05   LAC  --  1.8       No results found for: [employer]    MRSA Nasal Swab: N/A. Non-respiratory infection. .    Assessment:  Date:  Dose/Freq Admin Times Level/Time:   4/5 750 mg x 1 1516                              Plan:  Concentration-guided dosing due to renal impairment  Given 750 mg x 1 today  Repeat vancomycin concentration ordered for 4/5 @ 0400    Pharmacy will continue to monitor patient and adjust therapy as indicated    Thank you for the consult,  Rabia Chau, PharmD

## 2022-04-04 NOTE — PROGRESS NOTES
ACUTE OT GOALS:  (Developed with and agreed upon by patient and/or caregiver.)  1. Patient will complete upper body bathing and dressing with minimal assistance and adaptive equipment as needed. 2. Patient will complete toileting and toilet transfer with moderate assistance to improve independence with self-care. 3. Patient will tolerate 30 minutes of OT treatment with 2-3 rest breaks to increase activity tolerance for ADLs. 4. Patient will complete functional transfers with minimal assistance and adaptive equipment as needed. 5. Patient will complete functional mobility for household distances with minimal assistance and appropriate safety awareness.      Timeframe: 7 visits       OCCUPATIONAL THERAPY ASSESSMENT: Initial Assessment and Daily Note OT Treatment Day # 1    Jean Claude Clark is a 80 y.o. male   PRIMARY DIAGNOSIS: <principal problem not specified>  [facility] [N17.9]  Procedure(s) (LRB):  ESOPHAGOGASTRODUODENOSCOPY (EGD) *Rm 610 Transport at 1130 Sodium recheck (N/A)     Reason for Referral:    ICD-10: Treatment Diagnosis: Generalized Muscle Weakness (M62.81)  Other lack of cordination (R27.8)  History of falling (Z91.81)  [place] / Product Type: Medicare /   ASSESSMENT:     REHAB RECOMMENDATIONS:   Recommendation to date pending progress:  Setting:   Short-term Rehab  Equipment:    To Be Determined     PRIOR LEVEL OF FUNCTION:  (Prior to Hospitalization)  INITIAL/CURRENT LEVEL OF FUNCTION:  (Based on today's evaluation)   Bathing:   Unknown (not performing well according to family)  Dressing:   Unknown  Feeding/Grooming:   Modified Independent  Toileting:   Pt incontinent at home; soiling himself  Functional Mobility:   Independent Bathing:   Maximal Assistance  Dressing:   Maximal Assistance  Feeding/Grooming:   Maximal Assistance  Toileting:   Total Assistance  Functional Mobility:   Moderate Assistance x 2 ASSESSMENT:  Mr. Katie Jordan presents to the hospital with CATE. Pt's family noticed increased weakness and more difficulty with functional mobility at home. Grandson at bedside states he has been living on his own but it is questionable how well he has been managing with ADLs. Pt found soiled and couch where he sits was completely soiled. Family reports pt is resistive to getting assistance and is very stubborn. They have noticed more confusion in evening and decreased short-term memory. Pt is mostly cooperative but doesn't really communicate with therapists. Pt is fidgeting with gown throughout session. Pt was mod A x 2 for bed mobility and sitting to edge of bed with cues to stay upright. Pt did attempt standing multiple times needing moderate assistance x 2. Pt leaning against the bed and and was only able to take a few side steps. Pt returned back to bed at end of session and positioned for comfort. Grandson reports sore great toes due to overgrown toe nails. Pt is currently functioning below baseline and will benefit from OT services to address stated goals and plan of care. PM: Per update in notes family has decided to pursue hospice care.       SUBJECTIVE:   Mr. Katie Jordan states, \"[place]\"    SOCIAL HISTORY/LIVING ENVIRONMENT: Lives alone; 1 level; 1 KEISHA; managing ADLs on his own but questionable how well; incontinent; not using AD for functional mobility       OBJECTIVE:     PAIN: VITAL SIGNS: LINES/DRAINS:   Pre Treatment: Pain Screen  Pain Scale 1: Numeric (0 - 10)  Pain Intensity 1: 0  Post Treatment: 0   IV and Purewick  O2 Device: None (Room air)     GROSS EVALUATION:   Within Functional Limits Abnormal/ Functional Abnormal/ Non-Functional (see comments) Not Tested Comments:   AROM [] [x] [] [] Decreased in all extremities   PROM [] [] [] [x]    Strength [] [x] [] [] Generally decreased in all extremities    Balance [] [x] [] [] Fair to fair- sitting; fair- to poor standing    Posture [] [x] [] [] Flexed/rounded    Sensation [] [] [] [x]    Coordination [] [x] [] []    Tone [] [] [] [x]    Edema [] [] [] [x]    Activity Tolerance [] [x] [] []     [] [] [] []      COGNITION/  PERCEPTION: Intact Impaired   (see comments) Comments:   Orientation [] [x]    Vision [] []    Hearing [] []    Judgment/ Insight [] [x]    Attention [] [x]    Memory [] [x]    Command Following [] [x]    Emotional Regulation [] [x]     [] []      ACTIVITIES OF DAILY LIVING: I Mod I S SBA CGA Min Mod Max Total NT Comments   BASIC ADLs:              Bathing/ Showering [] [] [] [] [] [] [] [] [] [x]    Toileting [] [] [] [] [] [] [] [] [] [x]    Dressing [] [] [] [] [] [] [] [] [x] [] Donning socks   Feeding [] [] [] [] [] [] [] [] [] [x]    Grooming [] [] [] [] [] [] [] [] [] [x]    Personal Device Care [] [] [] [] [] [] [] [] [] [x]    Functional Mobility [] [] [] [] [] [] [x] [] [] [] X 2   I=Independent, Mod I=Modified Independent, S=Supervision, SBA=Standby Assistance, CGA=Contact Guard Assistance,   Min=Minimal Assistance, Mod=Moderate Assistance, Max=Maximal Assistance, Total=Total Assistance, NT=Not Tested    MOBILITY: I Mod I S SBA CGA Min Mod Max Total  NT x2 Comments:   Supine to sit [] [] [] [] [] [] [x] [x] [] [] [x]    Sit to supine [] [] [] [] [] [] [x] [] [] [] [x]    Sit to stand [] [] [] [] [] [] [x] [] [] [] [x]    Bed to chair [] [] [] [] [] [] [] [] [] [x] []    I=Independent, Mod I=Modified Independent, S=Supervision, SBA=Standby Assistance, CGA=Contact Guard Assistance,   Min=Minimal Assistance, Mod=Moderate Assistance, Max=Maximal Assistance, Total=Total Assistance, NT=Not [facility] 6 Clicks   Daily Activity Inpatient Short Form        How much help from another person does the patient currently need. .. Total A Lot A Little None   1. Putting on and taking off regular lower body clothing? [x] 1   [] 2   [] 3   [] 4   2. Bathing (including washing, rinsing, drying)?    [] 1   [x] 2   [] 3 [] 4   3. Toileting, which includes using toilet, bedpan or urinal?   [x] 1   [] 2   [] 3   [] 4   4. Putting on and taking off regular upper body clothing? [] 1   [x] 2   [] 3   [] 4   5. Taking care of personal grooming such as brushing teeth? [] 1   [x] 2   [] 3   [] 4   6. Eating meals? [] 1   [x] 2   [] 3   [] 4   © 2007, Trustees of [address] [place] [address] [address] [address], under license to [employer]. All rights reserved     Score:  Initial: 10 Most Recent: X (Date: -- )   Interpretation of Tool:  Represents activities that are increasingly more difficult (i.e. Bed mobility, Transfers, Gait). PLAN:   FREQUENCY/DURATION: OT Plan of Care: 3 times/week for duration of hospital stay or until stated goals are met, whichever comes first.    PROBLEM LIST:   (Skilled intervention is medically necessary to address:)  1. Decreased ADL/Functional Activities  2. Decreased Activity Tolerance  3. Decreased AROM/PROM  4. Decreased Balance  5. Decreased Cognition  6. Decreased Coordination  7. Decreased Gait Ability  8. Decreased Strength  9. Decreased Transfer Abilities   INTERVENTIONS PLANNED:   (Benefits and precautions of occupational therapy have been discussed with the patient.)  1. Self Care Training  2. Therapeutic Activity  3. Therapeutic Exercise/HEP  4. Neuromuscular Re-education  5. Education     TREATMENT:     EVALUATION: Moderate Complexity : (Untimed Charge)    TREATMENT:   ( $$ Neuromuscular Re-Education: 8-22 mins   )  Co-Treatment PT/OT necessary due to patient's decreased overall endurance/tolerance levels, as well as need for high level skilled assistance to complete functional transfers/mobility and functional tasks  Neuromuscular Re-education (15 Minutes): Neuromuscular Re-education included Balance Training, Coordination training, Postural training, Sitting balance training and Standing balance training to improve Balance, Coordination and Postural Control.     TREATMENT GRID:  N/A    AFTER TREATMENT POSITION/PRECAUTIONS:  Alarm Activated, Bed, Needs within reach, RN notified and Visitors at bedside    INTERDISCIPLINARY COLLABORATION:  RN/PCT, PT/PTA and OT/HERNANDEZ    TOTAL TREATMENT DURATION:  OT Patient Time In/Time Out  Time In: 0953  Time Out: 2950 Annabelle Bourgeois OT

## 2022-04-04 NOTE — ACP (ADVANCE CARE PLANNING)
[facility] Hospitalist Service  At the heart of better care     Advance Care Planning   Admit Date:  4/3/2022 10:35 AM   Name:  Larissa Holden   Age:  80 y.o. Sex:  male  :  1927   MRN:  [id number]   Room:  ENDO/PL    Larissa Holden is able to make his own decisions: Yes    If pt unable to make decisions, POA/surrogate decision maker:  Nephew    Other members present in the meeting:   Daughter    Patient / surrogate decision-maker directed:  Code Status: FULL CODE -full aggressive medical and surgical interventions, including intubations, resuscitations, pressors, artificial tube feeding    Other ACP topics discussed, if applicable:   Patient lives alone and family trying to get home hospice for the patient. Patient or surrogate consented to discussion of the current conditions, workup, management plans, prognosis, and understand the risk for further deterioration. Time spent: 16 minutes in direct discussion (face to face and/or over phone).     Signed:  Caroline Byrd MD

## 2022-04-04 NOTE — PROGRESS NOTES
Resting comfortably in bed. Remains nonverbal but cooperative. IV infusing. Hourly rounds completed, all needs met this shift. Bed in L/L with call light in reach and alarm on. Will continue to monitor care and give report to oncoming nurse. NPO since midnight 4/4. Granddaughter at bedside.

## 2022-04-04 NOTE — INTERVAL H&P NOTE
Update History & Physical    The Patient's History and Physical attached below was reviewed with the patient and I examined the patient. There was no change in the plan, or pertinent findings. The surgical site was confirmed with the patient. Plan:  The risk, benefits, expected outcome, and alternative to the recommended procedure have been discussed with the patient. Patient understands and wants to proceed with the procedure.     Electronically signed by Ethel Barry MD

## 2022-04-04 NOTE — CONSULTS
Comprehensive Nutrition Assessment    Type and Reason for Visit: Initial,Consult,Positive nutrition screen  Best Practice Alert for Malnutrition Screening Tool: Recently Lost Weight Without Trying: Yes, If Yes, How Much Weight Loss: >33 lbs, Eating Poorly Due to Decreased Appetite: Yes  Poor intake/appetite 5 or more days (hospitalist)    Nutrition Recommendations/Plan:   Meals and Snacks:  Continue current diet. Progression per GI. Nutrition Supplement Therapy:   Medical food supplement therapy:  Continue Ensure Enlive three times per day (this provides 350 kcal and 20 grams protein per bottle)    Daily weights ordered.  Thiamine 100 mg daily times 7 days started given hx of declining intake of liquids prior to hospital presentation, potential for refeeding syndrome. Malnutrition Assessment:  Malnutrition Status: Severe malnutrition  Context: Chronic illness  Findings of clinical characteristics of malnutrition:   Energy Intake:  Mild decrease in energy intake (specify) (home intake of ~3 ensure plus daily reported)  Weight Loss:  Unable to assess (wt hx not available, current wt without wt source)     Body Fat Loss:  7 - Severe body fat loss, Triceps,Buccal region,Orbital   Muscle Mass Loss:  7 - Severe muscle mass loss, Calf (gastrocnemius),Hand (interosseous),Temples (temporalis)  Fluid Accumulation:  No significant fluid accumulation,     Strength:  Not performed     Nutrition Assessment:   Nutrition History: Has been consuming ensure (likely ensure plus per description), cofee and mountain dew. Progressive difficulties with po over the last year, leading to only liquid intake recently. Pt lives alone. Coughing/regurgitation with liquids most recently which was significantly limiting overall intake. Hx per , Ladan Ballesteros at bedside. Pt nods in agreement, minimal verbal interaction with RD during visit. Pt with reported wt loss over the past year, unknown baseline weight. Nutrition Background:  PMH BMD and esophageal stricture. Admitted with CATE, UTI, hypernatremia and aspiration pneumonitis bibasilar. s/p EGD w dilation 4/4. Wound care eval pending. Nutrition Interval:  Pt with minimal verbal interaction during my visit. Agreeable to ensure, drank several sips during my visit. NFPE limited for pt comfort. Pt visually appears less than recorded weight, greater than 6 blankets on bed at RD visit so bed value likely not accurate. Nutrient needs estimated on lower end given inability to validate current wt. Nutrition Related Findings:   NFPE limited for pt comfort and significant amount of covers on pt. Current Nutrition Therapies:  ADULT ORAL NUTRITION SUPPLEMENT Lunch, Dinner; Standard High Calorie/High Protein  ADULT DIET Full Liquid    Current Intake:   Average Meal Intake: Unable to assess (diet adv for evening meal) Average Supplement Intake: Unable to assess      Anthropometric Measures:  Height: 5' 8\" (172.7 cm)  Current Body Wt: 54.4 kg (119 lb 14.9 oz) (4/4), Weight source: Not specified  BMI: 18.2, Underweight (BMI less than 22) age over 72  Admission Body Weight: 119 lb 14.9 oz (no wt source)  Ideal Body Weight (lbs) (Calculated): 154 lbs (70 kg), 77.9 %  Usual Body Wt:  (no wt hx available in EMR, pt does not provide), Percent weight change:   unable to evaluate at this time.          Edema: No data recorded  Estimated Daily Nutrient Needs:  Energy (kcal/day): 3514-3298 (Kcal/kg (25-30), Weight Used: Admission)  Protein (g/day): 54-65 (1-1.2 g/kg) Weight Used: (Admission)  Fluid (ml/day):   (1 ml/kcal)    Nutrition Diagnosis:   · Inadequate oral intake related to altered GI function as evidenced by  (NPO prior to EGD w dilation)    · Severe malnutrition,In context of chronic illness related to altered GI function as evidenced by  (declining oral intake, criteria as above)    Nutrition Interventions:   Food and/or Nutrient Delivery: Continue current diet,Continue oral nutrition supplement     Coordination of Nutrition Care: Continue to monitor while inpatient,Interdisciplinary rounds    Goals: Active Goal: Meet >50% estimated needs by nutrition follow-up    Nutrition Monitoring and Evaluation:      Food/Nutrient Intake Outcomes: Food and nutrient intake,Supplement intake,Diet advancement/tolerance  Physical Signs/Symptoms Outcomes: Biochemical data,GI status,Weight    Discharge Planning:     Too soon to determine    Genevieve Dumont RD, LDN   Contact: [phone]

## 2022-04-04 NOTE — ANESTHESIA PREPROCEDURE EVALUATION
Anesthetic History   No history of anesthetic complications            Review of Systems / Medical History  Patient summary reviewed and pertinent labs reviewed    Pulmonary  Within defined limits                 Neuro/Psych   Within defined limits           Cardiovascular                Pertinent negatives: No CAD  Exercise tolerance: <4 METS: Able to ambulate short distances     GI/Hepatic/Renal     GERD: well controlled           Endo/Other  Within defined limits           Other Findings   Comments: Esophageal stenosis  presbycusis           Physical Exam    Airway  Mallampati: II  TM Distance: 4 - 6 cm  Neck ROM: normal range of motion   Mouth opening: Normal     Cardiovascular  Regular rate and rhythm,  S1 and S2 normal,  no murmur, click, rub, or gallop  Rhythm: regular  Rate: normal         Dental    Dentition: Poor dentition     Pulmonary  Breath sounds clear to auscultation               Abdominal  GI exam deferred       Other Findings            Anesthetic Plan    ASA: 4  Anesthesia type: total IV anesthesia          Induction: Intravenous  Anesthetic plan and risks discussed with: Patient and Family      d5 infusing. Na slowly improving.

## 2022-04-04 NOTE — PROGRESS NOTES
Problem: Pressure Injury - Risk of  Goal: *Prevention of pressure injury  Description: Document Gerald Scale and appropriate interventions in the flowsheet. Outcome: Progressing Towards Goal  Note: Pressure Injury Interventions:  Sensory Interventions: Assess changes in LOC,Check visual cues for pain,Minimize linen layers    Moisture Interventions: Absorbent underpads,Apply protective barrier, creams and emollients,Check for incontinence Q2 hours and as needed,Internal/External urinary devices    Activity Interventions: Assess need for specialty bed,Pressure redistribution bed/mattress(bed type),PT/OT evaluation    Mobility Interventions: HOB 30 degrees or less,Pressure redistribution bed/mattress (bed type),PT/OT evaluation    Nutrition Interventions: Document food/fluid/supplement intake,Offer support with meals,snacks and hydration    Friction and Shear Interventions: Apply protective barrier, creams and emollients,HOB 30 degrees or less,Minimize layers                Problem: Patient Education: Go to Patient Education Activity  Goal: Patient/Family Education  Outcome: Progressing Towards Goal     Problem: Falls - Risk of  Goal: *Absence of Falls  Description: Document Reddy Fall Risk and appropriate interventions in the flowsheet.   Outcome: Progressing Towards Goal  Note: Fall Risk Interventions:  Mobility Interventions: Assess mobility with egress test,Bed/chair exit alarm,Patient to call before getting OOB,PT Consult for mobility concerns    Mentation Interventions: Adequate sleep, hydration, pain control,Bed/chair exit alarm,Door open when patient unattended,Family/sitter at bedside,Reorient patient,More frequent rounding    Medication Interventions: Assess postural VS orthostatic hypotension,Patient to call before getting OOB,Teach patient to arise slowly,Evaluate medications/consider consulting pharmacy    Elimination Interventions: Bed/chair exit alarm,Call light in reach,Toilet paper/wipes in reach    History of Falls Interventions: Bed/chair exit alarm,Door open when patient unattended,Evaluate medications/consider consulting pharmacy,Investigate reason for fall         Problem: Patient Education: Go to Patient Education Activity  Goal: Patient/Family Education  Outcome: Progressing Towards Goal

## 2022-04-04 NOTE — PROGRESS NOTES
TRANSFER - OUT REPORT:    Verbal report given to [facility], RN on Larissa Holden  being transferred to 6th floor for routine progression of care       Report consisted of patients Situation, Background, Assessment and   Recommendations(SBAR). Information from the following report(s) Procedure Summary was reviewed with the receiving nurse. Lines:   Peripheral IV 04/03/22 Right;Upper Forearm (Active)   Site Assessment Clean, dry, & intact 04/04/22 0515   Phlebitis Assessment 0 04/04/22 0515   Infiltration Assessment 0 04/04/22 0515   Dressing Status Clean, dry, & intact 04/04/22 0515   Dressing Type Tape;Transparent 04/04/22 0515   Hub Color/Line Status Flushed;Patent; Infusing 04/04/22 0515   Alcohol Cap Used Yes 04/04/22 0515        Opportunity for questions and clarification was provided.       Patient transported with:   [employer]

## 2022-04-04 NOTE — PROCEDURES
EGD Procedure Note    PreOp Diagnosis:   Dysphagia  Weight loss    PostOp Diagnosis:  Esophageal stricture    Medications:  Monitored Anesthesia    Procedure:  EGD   Instrument:   After informed consent was obtained, the patient was sedated and the endoscope was inserted  in the mouth and advanced into the duodenum without difficulty. The scope was slowly withdrawn while the mucosa was carefully inspected, including a retroflexed view of the cardia and fundus. Findings:   Esophagus: The proximal and mid esophagus appeared normal.  In the distal esophagus, there was a thin, ring-like stricture with a benign appearance. Mild mucosal disruption with scope passage. Mild, LA Class A esophagitis present. Arzate esophageal dilation was performed in standard fashion with a 36, 43, and 55 French dilator. There was mild resistance. Re-inspection with no evidence of complications. Stomach: Normal, without ulcers, erosions, or polyps. Duodenum:   Mild nodularity in the bulb, otherwise Normal    Recommendations:  Advance diet   Avoid NSAIDs  Careful mastication  Repeat dilation as needed  Continue Protonix or omeprazole  Stable for DC home from GI standpoint.     Chandana Beck MD

## 2022-04-04 NOTE — PROGRESS NOTES
TRANSFER - OUT REPORT:    Verbal report given to Rebecca RN(name) on Jacinto Remedies Hayley  being transferred to GI lab(unit) for ordered procedure       Report consisted of patients Situation, Background, Assessment and   Recommendations(SBAR). Information from the following report(s) SBAR was reviewed with the receiving nurse. Opportunity for questions and clarification was provided. IV fluids infusing. Angela Verma at bedside with patient is is POA.

## 2022-04-04 NOTE — PROGRESS NOTES
Hospitalist Progress Note   Admit Date:  4/3/2022 10:35 AM   Name:  Nathan Padilla   Age:  80 y.o. Sex:  male  :  1927   MRN:  [id number]   Room:  ENDO/PL    Presenting Complaint: Skin Infection and Altered mental status    Reason(s) for Admission: CATE (acute kidney injury) [facility]) [N17.9]     Hospital Course & Interval History:   70-year-old male w/ hx of BPD and esophegeal stricture status post dilation admitted with worsening weakness with decreased ambulation and decreased p.o. intake. Work-up showed CATE, UTI, hypernatremia and aspiration pneumonitis bibasilar. Subjective/24hr Events (22): Patient is seen at the bedside. Very pleasant but not communicating. Daughter at bedside and reports patient did not like to talk in the hospital but at baseline he communicates at home. Patient using his hands and facial expression to communicate. Denies chest pain, palpitation, vomiting or abdominal pain. Daughter reports patient with severe reflux for the past 4 months and eats only on Ensure and some liquid diet. Not able to tolerate solid. Also reports patient lives by himself and they are looking option for home hospice to have support at home. Discussed CODE STATUS and patient would like to be full code. Health power of  with his nephew. ROS:  10 systems reviewed and negative except as noted above. Assessment & Plan:     History of esophageal stricture:  Patient with history of esophageal stricture and post dilation  Not able to tolerate solid.   Only on Ensure and liquids at home for 4 months  Continue maintenance IV fluid  GI consulted and patient is scheduled for EGD today with possible dilation  Nutrition consult  SLP consult    CATE:  Prerenal in the setting of dehydration, vomiting and decreased oral intake  Continue gentle hydration  Avoid nephrotoxic agent    Complicated UTI, in the setting of BPH:  Urine culture growing Enterococcus species  Fluids ceftriaxone to vancomycin and will de-escalate as per susceptibilities    Hypernatremia:  Likely secondary to dehydration  Sodium 154  Switch fluids to D5W  Continue to monitor    Hypotension:  Hold antihypertensive  Give patient small bolus IV fluid and midodrine 5 mg  Continue to monitor     BPH:  Continue finasteride and doxazosin    Pressure skin injuries:  Wound care consult      Discharge Planning: To be determined. Patient may need short-term rehab on discharge. PPD ordered. Case management consulted.     Diet:  DIET NPO  ADULT ORAL NUTRITION SUPPLEMENT Lunch, Dinner; Standard High Calorie/High Protein  DVT PPx: Heparin  Code status: Full Code    Hospital Problems as of 4/4/2022 Date Reviewed: 7/6/2017          Codes Class Noted - Resolved POA    UTI (urinary tract infection) ICD-10-CM: N39.0  ICD-9-CM: 599.0  4/4/2022 - Present Unknown        Hypotension ICD-10-CM: I95.9  ICD-9-CM: 458.9  4/4/2022 - Present Unknown        Hypernatremia ICD-10-CM: E87.0  ICD-9-CM: 276.0  4/4/2022 - Present Unknown        Esophageal stricture ICD-10-CM: K22.2  ICD-9-CM: 530.3  4/4/2022 - Present Unknown        * (Principal) CATE (acute kidney injury) ([facility] [address] ICD-10-CM: N17.9  ICD-9-CM: 584.9  4/3/2022 - Present Unknown              Objective:     Patient Vitals for the past 24 hrs:   Temp Pulse Resp BP SpO2   04/04/22 1136 98.3 °F (36.8 °C) 81 16 106/65 97 %   04/04/22 0944    100/77    04/04/22 0804 98.1 °F (36.7 °C) 69 16 (!) 86/52 90 %   04/04/22 0453 98.2 °F (36.8 °C) 83 16 (!) 99/59 97 %   04/03/22 2329 98.3 °F (36.8 °C) (!) 102 16 93/69 97 %   04/03/22 1955 97.8 °F (36.6 °C) 86 16 (!) 84/50 91 %   04/03/22 1536 97.6 °F (36.4 °C) 92 18 95/78 100 %   04/03/22 1430  82  101/73    04/03/22 1413  90   98 %   04/03/22 1401  87 17 106/73      Oxygen Therapy  O2 Sat (%): 97 % (04/04/22 1136)  Pulse via Oximetry: 79 beats per minute (04/04/22 1136)  O2 Device: None (Room air) (04/04/22 1136)    Estimated body mass index is 18.25 kg/m² as calculated from the following:    Height as of this encounter: 5' 8\" (1.727 m). Weight as of this encounter: 54.4 kg (120 lb). Intake/Output Summary (Last 24 hours) at 4/4/2022 1236  Last data filed at 4/4/2022 0350  Gross per 24 hour   Intake    Output 500 ml   Net -500 ml         Physical Exam:     Blood pressure 106/65, pulse 81, temperature 98.3 °F (36.8 °C), resp. rate 16, height 5' 8\" (1.727 m), weight 54.4 kg (120 lb), SpO2 97 %. General:    No overt distress  Head:  Normocephalic, atraumatic  Eyes:  Sclerae appear normal.  Pupils equally round. ENT:  Nares appear normal, no drainage. Dry oral mucosa  Neck:  No restricted ROM. Trachea midline   CV:   RRR. No m/r/g. No jugular venous distension. Lungs:   CTAB. No wheezing, rhonchi, or rales. Respirations even, unlabored  Abdomen: Bowel sounds present. Soft, nontender, nondistended. Extremities: No cyanosis or clubbing. No edema  Skin:     Superficial buttock ulceration  Neuro:  CN II-XII grossly intact. Sensation intact. A&Ox3  Psych:  Normal mood and affect.       I have reviewed ordered lab tests and independently visualized imaging below:    Recent Labs:  Recent Results (from the past 48 hour(s))   CULTURE, BLOOD    Collection Time: 04/03/22 10:37 AM    Specimen: Blood   Result Value Ref Range    Special Requests: LEFT  Antecubital        Culture result: NO GROWTH AFTER 20 HOURS     LACTIC ACID    Collection Time: 04/03/22 10:37 AM   Result Value Ref Range    Lactic acid 1.8 0.4 - 2.0 MMOL/L   CBC WITH AUTOMATED DIFF    Collection Time: 04/03/22 10:37 AM   Result Value Ref Range    WBC 9.4 4.3 - 11.1 K/uL    RBC 3.70 (L) 4.23 - 5.6 M/uL    HGB 11.9 (L) 13.6 - 17.2 g/dL    HCT 38.4 (L) 41.1 - 50.3 %    .8 (H) 79.6 - 97.8 FL    MCH 32.2 26.1 - 32.9 PG    MCHC 31.0 (L) 31.4 - 35.0 g/dL    RDW 13.2 11.9 - 14.6 %    PLATELET 444 557 - 340 K/uL    MPV 11.3 9.4 - 12.3 FL    ABSOLUTE NRBC 0.00 0.0 - 0.2 K/uL DF AUTOMATED      NEUTROPHILS 57 43 - 78 %    LYMPHOCYTES 21 13 - 44 %    MONOCYTES 8 4.0 - 12.0 %    EOSINOPHILS 13 (H) 0.5 - 7.8 %    BASOPHILS 0 0.0 - 2.0 %    IMMATURE GRANULOCYTES 0 0.0 - 5.0 %    ABS. NEUTROPHILS 5.4 1.7 - 8.2 K/UL    ABS. LYMPHOCYTES 2.0 0.5 - 4.6 K/UL    ABS. MONOCYTES 0.8 0.1 - 1.3 K/UL    ABS. EOSINOPHILS 1.2 (H) 0.0 - 0.8 K/UL    ABS. BASOPHILS 0.0 0.0 - 0.2 K/UL    ABS. IMM. GRANS. 0.0 0.0 - 0.5 K/UL   PROCALCITONIN    Collection Time: 04/03/22 10:37 AM   Result Value Ref Range    Procalcitonin <0.05 0.00 - 1.66 ng/mL   METABOLIC PANEL, COMPREHENSIVE    Collection Time: 04/03/22 10:37 AM   Result Value Ref Range    Sodium 149 (H) 138 - 145 mmol/L    Potassium 3.6 3.5 - 5.1 mmol/L    Chloride 115 (H) 98 - 107 mmol/L    CO2 30 21 - 32 mmol/L    Anion gap 4 (L) 7 - 16 mmol/L    Glucose 198 (H) 65 - 100 mg/dL    BUN 42 (H) 8 - 23 MG/DL    Creatinine 1.60 (H) 0.8 - 1.5 MG/DL    GFR est AA 52 (L) >60 ml/min/1.73m2    GFR est non-AA 43 (L) >60 ml/min/1.73m2    Calcium 9.1 8.3 - 10.4 MG/DL    Bilirubin, total 0.6 0.2 - 1.1 MG/DL    ALT (SGPT) 35 12 - 65 U/L    AST (SGOT) 26 15 - 37 U/L    Alk.  phosphatase 65 50 - 136 U/L    Protein, total 7.5 6.3 - 8.2 g/dL    Albumin 2.5 (L) 3.2 - 4.6 g/dL    Globulin 5.0 (H) 2.3 - 3.5 g/dL    A-G Ratio 0.5 (L) 1.2 - 3.5     EKG, 12 LEAD, INITIAL    Collection Time: 04/03/22 10:42 AM   Result Value Ref Range    Ventricular Rate 89 BPM    Atrial Rate 89 BPM    P-R Interval 164 ms    QRS Duration 142 ms    Q-T Interval 448 ms    QTC Calculation (Bezet) 546 ms    Calculated P Axis 67 degrees    Calculated R Axis -131 degrees    Calculated T Axis 46 degrees    Diagnosis       Sinus rhythm  Right bundle branch block      Confirmed by Jg Hernandez ([id number]) on 4/3/2022 4:22:15 PM     CULTURE, BLOOD    Collection Time: 04/03/22 12:27 PM    Specimen: Blood   Result Value Ref Range    Special Requests: RIGHT  FOREARM        Culture result: NO GROWTH AFTER 18 HOURS COVID-19 RAPID TEST    Collection Time: 04/03/22 12:27 PM   Result Value Ref Range    Specimen source NASAL      COVID-19 rapid test Not detected NOTD     URINALYSIS W/ RFLX MICROSCOPIC    Collection Time: 04/03/22 12:45 PM   Result Value Ref Range    Color YELLOW      Appearance CLOUDY      Specific gravity 1.010 1.001 - 1.023      pH (UA) 8.0 5.0 - 9.0      Protein TRACE (A) NEG mg/dL    Glucose Negative mg/dL    Ketone Negative NEG mg/dL    Bilirubin Negative NEG      Blood LARGE (A) NEG      Urobilinogen 0.2 0.2 - 1.0 EU/dL    Nitrites Positive (A) NEG      Leukocyte Esterase LARGE (A) NEG     CULTURE, URINE    Collection Time: 04/03/22 12:45 PM    Specimen: Clean catch; Urine   Result Value Ref Range    Special Requests: NO SPECIAL REQUESTS      Culture result: (A)       >100,000 COLONIES/mL PRESUMPTIVE ENTEROCOCCUS SPECIES IDENTIFICATION AND SUSCEPTIBILITY TO FOLLOW    Culture result: <10,000 COLONIES/mL NORMAL SKIN MARY ISOLATED      Culture result: CULTURE IN PROGRESS,FURTHER UPDATES TO FOLLOW     URINE MICROSCOPIC    Collection Time: 04/03/22 12:45 PM   Result Value Ref Range    WBC 0-3 0 /hpf    RBC 20-50 0 /hpf    Epithelial cells 0-3 0 /hpf    Bacteria TRACE 0 /hpf    Casts 0 0 /lpf    Crystals, urine 0 0 /LPF    Mucus 1+ (H) 0 /lpf   METABOLIC PANEL, BASIC    Collection Time: 04/04/22  5:32 AM   Result Value Ref Range    Sodium 152 (H) 138 - 145 mmol/L    Potassium 3.7 3.5 - 5.1 mmol/L    Chloride 118 (H) 98 - 107 mmol/L    CO2 26 21 - 32 mmol/L    Anion gap 8 7 - 16 mmol/L    Glucose 149 (H) 65 - 100 mg/dL    BUN 42 (H) 8 - 23 MG/DL    Creatinine 1.50 0.8 - 1.5 MG/DL    GFR est AA 56 (L) >60 ml/min/1.73m2    GFR est non-AA 46 (L) >60 ml/min/1.73m2    Calcium 8.6 8.3 - 10.4 MG/DL   CBC W/O DIFF    Collection Time: 04/04/22  5:32 AM   Result Value Ref Range    WBC 9.3 4.3 - 11.1 K/uL    RBC 3.28 (L) 4.23 - 5.6 M/uL    HGB 10.5 (L) 13.6 - 17.2 g/dL    HCT 33.6 (L) 41.1 - 50.3 %    .4 (H) 79.6 - 97.8 FL    MCH 32.0 26.1 - 32.9 PG    MCHC 31.3 (L) 31.4 - 35.0 g/dL    RDW 13.2 11.9 - 14.6 %    PLATELET 011 517 - 490 K/uL    MPV 11.2 9.4 - 12.3 FL    ABSOLUTE NRBC 0.00 0.0 - 0.2 K/uL   SODIUM    Collection Time: 04/04/22 10:40 AM   Result Value Ref Range    Sodium 151 (H) 138 - 145 mmol/L       All Micro Results     Procedure Component Value Units Date/Time    CULTURE, URINE [[id number]]  (Abnormal) Collected: 04/03/22 1245    Order Status: Completed Specimen: Urine from Clean catch Updated: 04/04/22 0843     Special Requests: NO SPECIAL REQUESTS        Culture result:       >100,000 COLONIES/mL PRESUMPTIVE ENTEROCOCCUS SPECIES IDENTIFICATION AND SUSCEPTIBILITY TO FOLLOW                  <10,000 COLONIES/mL NORMAL SKIN MARY ISOLATED                  CULTURE IN PROGRESS,FURTHER UPDATES TO FOLLOW          BLOOD CULTURE [[id number]] Collected: 04/03/22 1037    Order Status: Completed Specimen: Blood Updated: 04/04/22 0729     Special Requests: --        LEFT  Antecubital       Culture result: NO GROWTH AFTER 20 HOURS       BLOOD CULTURE [[id number]] Collected: 04/03/22 1227    Order Status: Completed Specimen: Blood Updated: 04/04/22 0729     Special Requests: --        RIGHT  FOREARM       Culture result: NO GROWTH AFTER 18 HOURS       COVID-19 RAPID TEST [[id number]] Collected: 04/03/22 1227    Order Status: Completed Specimen: Nasopharyngeal Updated: 04/03/22 1259     Specimen source NASAL        Comment: RAPID ONLY        COVID-19 rapid test Not detected        Comment:      The specimen is NEGATIVE for SARS-CoV-2, the novel coronavirus associated with COVID-19. A negative result does not rule out COVID-19. This test has been authorized by the FDA under an Emergency Use Authorization (EUA) for use by authorized laboratories.         Fact sheet for Healthcare Providers: ConventionUpdate.co.nz  Fact sheet for Patients: ConventionUpdate.co.nz       Methodology: Isothermal Nucleic Acid Amplification               Other Studies:  No results found. Current Meds:  Current Facility-Administered Medications   Medication Dose Route Frequency    dextrose 5% infusion  75 mL/hr IntraVENous CONTINUOUS    vancomycin (VANCOCIN) 750 mg in 0.9% sodium chloride 250 mL ([id number])  750 mg IntraVENous Q12H    heparin (porcine) injection 5,000 Units  5,000 Units SubCUTAneous Q12H    sodium chloride (NS) flush 5-10 mL  5-10 mL IntraVENous Q8H    sodium chloride (NS) flush 5-10 mL  5-10 mL IntraVENous PRN    doxazosin (CARDURA) tablet 4 mg  4 mg Oral QHS    finasteride (PROSCAR) tablet 5 mg  5 mg Oral DAILY    sodium chloride (NS) flush 5-40 mL  5-40 mL IntraVENous Q8H    sodium chloride (NS) flush 5-40 mL  5-40 mL IntraVENous PRN    acetaminophen (TYLENOL) tablet 650 mg  650 mg Oral Q6H PRN    Or    acetaminophen (TYLENOL) suppository 650 mg  650 mg Rectal Q6H PRN    senna (SENOKOT) tablet 17.2 mg  2 Tablet Oral BID PRN    ondansetron (ZOFRAN) injection 4 mg  4 mg IntraVENous Q6H PRN    tuberculin injection 5 Units  5 Units IntraDERMal ONCE       Signed:  Agustin Bangura MD    Part of this note may have been written by using a voice dictation software. The note has been proof read but may still contain some grammatical/other typographical errors.

## 2022-04-04 NOTE — PROGRESS NOTES
Spoke briefly with . Melba Mann (on way to endo lab) and his grandson Mr. Caren Castro (cell [phone]) in room 610 about discharge planning. Their plan is to take him home after he is \"tuned up. \"  Tulio Brito said that he [facility] (her cell is [phone]), and CM spoke with her this morning as well. Marifer Hernandes also said that we could cancel OT and PT. Marifer Hernandes said that there are numerous family members to help with his care once he is back home.   Case management to follow and assist.     Care Management Interventions  Transition of Care Consult (CM Consult): Home Hospice  Support Systems: Child(les),Other Family Member(s)  The Plan for Transition of Care is Related to the Following Treatment Goals : when stable, home with home hospice

## 2022-04-04 NOTE — PROGRESS NOTES
Reviewed notes for spiritual concerns   noted the patient is nonverbal at this time   family is present

## 2022-04-04 NOTE — PROGRESS NOTES
Physician Progress Note      Cruz Tamayo  CSN #:                  [id number]  :                       1927  ADMIT DATE:       4/3/2022 10:35 AM  DISCH DATE:  RESPONDING  PROVIDER #:        Calixto Saleh MD          QUERY TEXT:    Patient admitted with CATE. Per ED provider note 4/3, noted to have possible pressure ulcer. If possible, please document in progress notes and discharge summary the location, present on admission status and stage of the pressure ulcer: The medical record reflects the following:  Risk Factors: Cachectic, debility, low BMI, age  Clinical Indicators: ED provider note 4/3, \"Skin breakdown into the dermis but not into subcutaneous tissues over the buttocks. Some erythema slight sloughing. Some areas are slightly moist others are dry\"  Treatment: Pure wick, skin assessment    Stage 1:  Non-blanchable erythema of intact skin  Stage 2:  Abrasion, Blister, Partial-thickness skin loss, with exposed dermis  Stage 3:  Full-thickness skin loss with damage or necrosis of subcutaneous tissue  Stage 4:  Full-thickness skin & soft tissue loss through to underlying muscle, tendon or bone  Unstageable: Obscured full-thickness skin & tissue loss  Options provided:  -- Stage 1 Pressure Ulcer of R buttock present on admission  -- Stage 2 Pressure Ulcer of R buttock present on admission  -- Stage 3 Pressure Ulcer of R buttock present on admission  -- Stage 4 Pressure Ulcer of R buttock present on admission  -- Deep tissue injury pressure ulcer of R buttock present on admission  -- Unstageable Pressure Ulcer R buttock present on admission  -- Other - I will add my own diagnosis  -- Disagree - Not applicable / Not valid  -- Disagree - Clinically unable to determine / Unknown  -- Refer to Clinical Documentation Reviewer    PROVIDER RESPONSE TEXT:    This patient has a stage 2 pressure ulcer of the R buttock which was present on admission. Query created by:  Sudhakar Alejandro on 4/4/2022 11:58 AM      QUERY TEXT:    Pt admitted with CATE. Pt noted to have possible cellulitis. If possible, please document in progress notes and discharge summary the present on admission status of cellulitis :    The medical record reflects the following:  Risk Factors: Age, debility, incontinence  Clinical Indicators: ED provider note 4/3, \"Source of infection could be cellulitis of the buttock. \" \"Increasing skin breakdown on his buttocks\"  Treatment: Skin assessment, Rocephin IV, Labs  Options provided:  -- Yes, cellulitis of the buttock was present at the time of the order to admit to the hospital  -- Other - I will add my own diagnosis  -- Disagree - Not applicable / Not valid  -- Disagree - Clinically unable to determine / Unknown  -- Refer to Clinical Documentation Reviewer    PROVIDER RESPONSE TEXT:    Provider is clinically unable to determine a response to this query. Query created by: Shahram Harrington on 4/4/2022 12:03 PM      QUERY TEXT:    Patient admitted with CATE. If possible, please document in progress notes and discharge summary if you are evaluating and /or treating any of the following: The medical record reflects the following:  Risk Factors: Age, debility, esophageal strictures,  Clinical Indicators: BMI 18, H/P 4/3, \"cachectic, moderate distress, appears stated age - Pale, No icterus, Dry mucous membranes. \" Hypernatremia, CATE  Treatment: IV fluids, labs,    ASPEN Criteria:  https://aspenjournals. onlinelibrary. kendall. com/doi/full/10.1177/5639404267310903  Options provided:  -- Protein calorie malnutrition mild  -- Protein calorie malnutrition moderate  -- Protein calorie malnutrition severe  -- Other - I will add my own diagnosis  -- Disagree - Not applicable / Not valid  -- Disagree - Clinically unable to determine / Unknown  -- Refer to Clinical Documentation Reviewer    PROVIDER RESPONSE TEXT:    This patient has mild protein calorie malnutrition. Query created by:  Oleg Willingham Enriqueta Jefferson on 4/4/2022 12:07 PM      Electronically signed by:  Carson Meeks MD 4/4/2022 1:15 PM

## 2022-04-05 VITALS
OXYGEN SATURATION: 94 % | RESPIRATION RATE: 18 BRPM | BODY MASS INDEX: 23.17 KG/M2 | SYSTOLIC BLOOD PRESSURE: 98 MMHG | HEART RATE: 82 BPM | DIASTOLIC BLOOD PRESSURE: 57 MMHG | WEIGHT: 152.9 LBS | TEMPERATURE: 98.4 F | HEIGHT: 68 IN

## 2022-04-05 PROBLEM — E43 SEVERE PROTEIN-CALORIE MALNUTRITION (HCC): Status: ACTIVE | Noted: 2022-04-05

## 2022-04-05 LAB
ACC. NO. FROM MICRO ORDER, ACCP: NORMAL
ANION GAP SERPL CALC-SCNC: 6 MMOL/L (ref 7–16)
BACTERIA SPEC CULT: ABNORMAL
BACTERIA SPEC CULT: ABNORMAL
BLOOD CULTURE PCR TESTING: NORMAL
BUN SERPL-MCNC: 40 MG/DL (ref 8–23)
CALCIUM SERPL-MCNC: 8.7 MG/DL (ref 8.3–10.4)
CHLORIDE SERPL-SCNC: 114 MMOL/L (ref 98–107)
CO2 SERPL-SCNC: 27 MMOL/L (ref 21–32)
CREAT SERPL-MCNC: 1.3 MG/DL (ref 0.8–1.5)
GLUCOSE SERPL-MCNC: 163 MG/DL (ref 65–100)
POTASSIUM SERPL-SCNC: 3.8 MMOL/L (ref 3.5–5.1)
SERVICE CMNT-IMP: ABNORMAL
SODIUM SERPL-SCNC: 147 MMOL/L (ref 136–145)
VANCOMYCIN SERPL-MCNC: 7 UG/ML

## 2022-04-05 PROCEDURE — 74011250636 HC RX REV CODE- 250/636: Performed by: FAMILY MEDICINE

## 2022-04-05 PROCEDURE — 74011250637 HC RX REV CODE- 250/637: Performed by: INTERNAL MEDICINE

## 2022-04-05 PROCEDURE — 36415 COLL VENOUS BLD VENIPUNCTURE: CPT

## 2022-04-05 PROCEDURE — 77030040393 HC DRSG OPTIFOAM GENT MDII -B

## 2022-04-05 PROCEDURE — 74011250637 HC RX REV CODE- 250/637: Performed by: HOSPITALIST

## 2022-04-05 PROCEDURE — 2709999900 HC NON-CHARGEABLE SUPPLY

## 2022-04-05 PROCEDURE — 80048 BASIC METABOLIC PNL TOTAL CA: CPT

## 2022-04-05 PROCEDURE — 74011000250 HC RX REV CODE- 250: Performed by: HOSPITALIST

## 2022-04-05 PROCEDURE — 80202 ASSAY OF VANCOMYCIN: CPT

## 2022-04-05 PROCEDURE — 74011250637 HC RX REV CODE- 250/637: Performed by: FAMILY MEDICINE

## 2022-04-05 RX ORDER — PANTOPRAZOLE SODIUM 40 MG/1
40 TABLET, DELAYED RELEASE ORAL
Qty: 30 TABLET | Refills: 0 | Status: SHIPPED | OUTPATIENT
Start: 2022-04-06 | End: 2022-05-06

## 2022-04-05 RX ORDER — AMOXICILLIN 500 MG/1
500 CAPSULE ORAL EVERY 8 HOURS
Status: DISCONTINUED | OUTPATIENT
Start: 2022-04-05 | End: 2022-04-05 | Stop reason: HOSPADM

## 2022-04-05 RX ORDER — SENNOSIDES 8.6 MG/1
2 TABLET ORAL
Qty: 60 TABLET | Refills: 0 | Status: SHIPPED | OUTPATIENT
Start: 2022-04-05 | End: 2022-05-05

## 2022-04-05 RX ORDER — AMOXICILLIN 500 MG/1
500 CAPSULE ORAL EVERY 8 HOURS
Qty: 12 CAPSULE | Refills: 0 | Status: SHIPPED | OUTPATIENT
Start: 2022-04-05 | End: 2022-04-09

## 2022-04-05 RX ORDER — LANOLIN ALCOHOL/MO/W.PET/CERES
100 CREAM (GRAM) TOPICAL DAILY
Qty: 5 TABLET | Refills: 0 | Status: SHIPPED | OUTPATIENT
Start: 2022-04-06 | End: 2022-04-11

## 2022-04-05 RX ADMIN — SODIUM CHLORIDE, PRESERVATIVE FREE 10 ML: 5 INJECTION INTRAVENOUS at 13:37

## 2022-04-05 RX ADMIN — Medication 100 MG: at 08:38

## 2022-04-05 RX ADMIN — HEPARIN SODIUM 5000 UNITS: 5000 INJECTION INTRAVENOUS; SUBCUTANEOUS at 01:27

## 2022-04-05 RX ADMIN — DEXTROSE MONOHYDRATE 75 ML/HR: 5 INJECTION, SOLUTION INTRAVENOUS at 01:33

## 2022-04-05 RX ADMIN — AMOXICILLIN 500 MG: 500 CAPSULE ORAL at 12:20

## 2022-04-05 RX ADMIN — FINASTERIDE 5 MG: 5 TABLET, FILM COATED ORAL at 08:38

## 2022-04-05 RX ADMIN — SODIUM CHLORIDE, PRESERVATIVE FREE 10 ML: 5 INJECTION INTRAVENOUS at 13:06

## 2022-04-05 RX ADMIN — HEPARIN SODIUM 5000 UNITS: 5000 INJECTION INTRAVENOUS; SUBCUTANEOUS at 12:15

## 2022-04-05 RX ADMIN — SODIUM CHLORIDE, PRESERVATIVE FREE 10 ML: 5 INJECTION INTRAVENOUS at 05:25

## 2022-04-05 NOTE — DISCHARGE SUMMARY
Hospitalist Discharge Summary   Admit Date:  4/3/2022 10:35 AM   DC Note date: 2022  Name:  Ajay Hood   Age:  80 y.o. Sex:  male  :  1927   MRN:  [id number]   Room:  [facility]  PCP:  Jay Mendosa MD    Presenting Complaint: Skin Infection and Altered mental status    Initial Admission Diagnosis: CATE (acute kidney injury) ([facility] [N17.9]     Problem List for this Hospitalization:  Hospital Problems as of 2022 Date Reviewed: 2017          Codes Class Noted - Resolved POA    Severe protein-calorie malnutrition ([facility] ICD-10-CM: E43  ICD-9-CM: 262  2022 - Present Yes        UTI (urinary tract infection) ICD-10-CM: N39.0  ICD-9-CM: 599.0  2022 - Present Unknown        Hypotension ICD-10-CM: I95.9  ICD-9-CM: 458.9  2022 - Present Unknown        Hypernatremia ICD-10-CM: E87.0  ICD-9-CM: 276.0  2022 - Present Unknown        Esophageal stricture ICD-10-CM: K22.2  ICD-9-CM: 530.3  2022 - Present Unknown        * (Principal) CATE (acute kidney injury) ([facility] ICD-10-CM: N17.9  ICD-9-CM: 584.9  4/3/2022 - Present Unknown            HPI:  68-year-old male w/ hx of BPD and esophegeal dilation,  brought in by EMS.   Most of the history is per grandson. Blossom Lease history per patient who is not verbal very much at all.  Patient appears to be may be oriented x1.  Grandson states patient lives by himself and they check on him frequently. Salomón Maynard noticed over the last 48 hours he has become increasingly weak with decreased ambulation.  Less p.o. intake.  He is regurgitating vomiting on occasion. Mills Cockayne had one fall but did not strike his head.  It was a witnessed fall. Mills Cockayne did not complain of pain after the fall.  Grandson states no fever chills or diarrhea.  He has been urinating frequently.  In reviewing his records, history reflux.  Frequent issues with esophageal stenosis with dilations up until about 2 or 3 years ago. Mills Cockayne has had pneumonia in the past. Lorenzo Mcburney states he does not think he is on any medications at this point but evidently was on some medications for prostatic hypertrophy in the past.  History of appendectomy as well.  Patient denies to me any pain at the moment or shortness of breath.  EMS called today because patient was not able to walk and has had some regurgitation and patient episodes of vomiting. Audrey Keating has had pneumonia in the past. Evon Carrasquillo also noticed increasing skin breakdown on his buttocks as well.  Strong odor to urine. On further questioning, his spx are worse for 1 week. He became very weak to even stand now but previously he is fully ambulating. He cannot tolerate liquids    Hospital Course:  80year-old male w/ hx of BPD and esophegeal stricture status post dilation admitted with worsening weakness with decreased ambulation and decreased p.o. intake. Work-up showed CATE, UTI, hypernatremia and concern for recurrent esophageal stricture. GI consulted and patient status post EGD on 4/4 which showed distal esophageal with stricturing, benign-appearing and patient s/p [facility] esophageal dilation. GI recommend advance diet as tolerated. Continue Protonix. Nutrition and speech consulted. CATE and hypernatremia improved with fluid resuscitation. Urine culture grew Enterococcus faecalis pansensitive. Vancomycin switched to p.o. amoxicillin. All other chronic conditions stable and we continued essential home meds. Patient and family opted for home hospice. Case management consulted and patient is stable to discharge to home hospice today. Disposition: Home Hospice  Diet: ADULT ORAL NUTRITION SUPPLEMENT Lunch, Dinner; Standard High Calorie/High Protein  ADULT DIET Dysphagia - Soft & Bite Sized  Code Status: Full Code    Follow Up Orders:   Follow-up Appointments   Procedures    FOLLOW UP VISIT Appointment in: 3 - 5 Days PCP     PCP     Standing Status:   Standing     Number of Occurrences:   1     Order Specific Question:   Appointment in     Answer:   3 - 5 Days Follow-up Information     Follow up With Specialties Details Why Contact Info    Other, MD Jay    Patient can only remember the practice name and not the physician            Time spent in patient discharge and coordination 38 minutes. Plan was discussed with patient and daughter. All questions answered. Patient was stable at time of discharge. Instructions given to call a physician or return if any concerns. Discharge Info:   Current Discharge Medication List      START taking these medications    Details   amoxicillin (AMOXIL) 500 mg capsule Take 1 Capsule by mouth every eight (8) hours for 4 days. Qty: 12 Capsule, Refills: 0  Start date: 4/5/2022, End date: 4/9/2022      pantoprazole (PROTONIX) 40 mg tablet Take 1 Tablet by mouth Daily (before breakfast) for 30 days. Qty: 30 Tablet, Refills: 0  Start date: 4/6/2022, End date: 5/6/2022      senna (SENOKOT) 8.6 mg tablet Take 2 Tablets by mouth two (2) times daily as needed for Constipation for up to 30 days. Qty: 60 Tablet, Refills: 0  Start date: 4/5/2022, End date: 5/5/2022      thiamine HCL (B-1) 100 mg tablet Take 1 Tablet by mouth daily for 5 doses. Qty: 5 Tablet, Refills: 0  Start date: 4/6/2022, End date: 4/11/2022         CONTINUE these medications which have NOT CHANGED    Details   finasteride (PROSCAR) 5 mg tablet Take 5 mg by mouth daily. Indications: benign prostatic hyperplasia with lower urinary tract sx      doxazosin (CARDURA) 4 mg tablet Take 4 mg by mouth nightly. Indications: BENIGN PROSTATIC HYPERTROPHY             Procedures done this admission:  Procedure(s):  ESOPHAGOGASTRODUODENOSCOPY (EGD) *Rm 610 Transport at 1130 Sodium recheck  [address] [facility] this admission:  IP CONSULT TO GASTROENTEROLOGY    Echocardiogram/EKG results:  No results found for this or any previous visit.        EKG Results     Procedure 720 Value Units Date/Time    EKG, 12 LEAD, INITIAL [[id number]] Collected: 04/03/22 1042    Order Status: Completed Updated: 04/03/22 1622     Ventricular Rate 89 BPM      Atrial Rate 89 BPM      P-R Interval 164 ms      QRS Duration 142 ms      Q-T Interval 448 ms      QTC Calculation (Bezet) 546 ms      Calculated P Axis 67 degrees      Calculated R Axis -131 degrees      Calculated T Axis 46 degrees      Diagnosis --     Sinus rhythm  Right bundle branch block      Confirmed by Rohini Hussein ([id number]) on 4/3/2022 4:22:15 PM            Diagnostic Imaging/Tests:   XR CHEST PORT    Result Date: 4/3/2022  History: Productive cough Exam: portable chest Comparison: 12/20/2012 Findings: No focal alveolar infiltrate or pleural effusion. No change in the appearance of the mediastinal contour or osseous structures. IMPRESSIONs: No acute findings. All Micro Results     Procedure Component Value Units Date/Time    CULTURE, URINE [[id number]]  (Abnormal)  (Susceptibility) Collected: 04/03/22 1245    Order Status: Completed Specimen: Urine from Clean catch Updated: 04/05/22 0903     Special Requests: NO SPECIAL REQUESTS        Culture result:       >100,000 COLONIES/mL ENTEROCOCCUS FAECALIS GROUP D                  <10,000 COLONIES/mL NORMAL SKIN MARY ISOLATED          BLOOD CULTURE [[id number]] Collected: 04/03/22 1227    Order Status: Completed Specimen: Blood Updated: 04/05/22 0645     Special Requests: --        RIGHT  FOREARM       Culture result: NO GROWTH 2 DAYS       BLOOD CULTURE ID PANEL [[id number]] Collected: 04/03/22 1037    Order Status: Completed Specimen: Blood Updated: 04/05/22 0344     Acc. no. from Micro Order [id number]     Blood Culture PCR Testing       MULTIPLEX PCR NEGATIVE:  A negative FilmArray BCID result does not exclude the possibility of bloodstream infection.           BLOOD CULTURE [[id number]] Collected: 04/03/22 1037    Order Status: Completed Specimen: Blood Updated: 04/05/22 0340     Special Requests: --        LEFT  Antecubital       GRAM STAIN       GRAM POS COCCI IN CLUSTERS AEROBIC BOTTLE POSITIVE               RESULTS VERIFIED, PHONED TO AND READ BACK BY DEYSI Peralta RN [id number] [phone] GM           Culture result:       CULTURE IN [facility] UPDATES TO FOLLOW                  Refer to Blood Culture ID Panel Accession  [id number]      COVID-19 RAPID TEST [[id number]] Collected: 04/03/22 1227    Order Status: Completed Specimen: Nasopharyngeal Updated: 04/03/22 1259     Specimen source NASAL        Comment: RAPID ONLY        COVID-19 rapid test Not detected        Comment:      The specimen is NEGATIVE for SARS-CoV-2, the novel coronavirus associated with COVID-19. A negative result does not rule out COVID-19. This test has been authorized by the FDA under an Emergency Use Authorization (EUA) for use by authorized laboratories. Fact sheet for Healthcare Providers: ConventionUpdate.co.nz  Fact sheet for Patients: ConventionUpdate.co.nz       Methodology: Isothermal Nucleic Acid Amplification               Labs: Results:       BMP, Mg, Phos Recent Labs     04/05/22  0607 04/04/22  1040 04/04/22  0532 04/03/22  1037 04/03/22  1037   * 151* 152*   < > 149*   K 3.8  --  3.7  --  3.6   *  --  118*  --  115*   CO2 27  --  26  --  30   AGAP 6*  --  8  --  4*   BUN 40*  --  42*  --  42*   CREA 1.30  --  1.50  --  1.60*   CA 8.7  --  8.6  --  9.1   *  --  149*  --  198*    < > = values in this interval not displayed.       CBC Recent Labs     04/04/22  0532 04/03/22  1037   WBC 9.3 9.4   RBC 3.28* 3.70*   HGB 10.5* 11.9*   HCT 33.6* 38.4*    327   GRANS  --  57   LYMPH  --  21   EOS  --  13*   MONOS  --  8   BASOS  --  0   IG  --  0   ANEU  --  5.4   ABL  --  2.0   LENNOX  --  1.2*   ABM  --  0.8   ABB  --  0.0   AIG  --  0.0      LFT Recent Labs     04/03/22  1037   ALT 35   AP 65   TP 7.5   ALB 2.5*   GLOB 5.0*   AGRAT 0.5*      Cardiac Testing No results found for: BNPP, BNP, CPK, RCK1, RCK2, RCK3, RCK4, CKMB, CKNDX, CKND1, TROPT, TROIQ   Coagulation Tests Lab Results   Component Value Date/Time    Prothrombin time 12.0 12/19/2012 09:25 PM    INR 1.2 12/19/2012 09:25 PM    aPTT 34.4 (H) 12/19/2012 09:25 PM      A1c No results found for: HBA1C, [id number]   Lipid Panel No results found for: CHOL, CHOLPOCT, CHOLX, CHLST, CHOLV, [id number], HDL, HDLP, LDL, LDLC, DLDLP, [id number], VLDLC, VLDL, TGLX, TRIGL, TRIGP, TGLPOCT, [facility], [facility]   Thyroid Panel Lab Results   Component Value Date/Time    TSH 1.637 12/20/2012 05:05 PM        Most Recent UA Lab Results   Component Value Date/Time    Color YELLOW 04/03/2022 12:45 PM    Appearance CLOUDY 04/03/2022 12:45 PM    pH (UA) 8.0 04/03/2022 12:45 PM    Protein TRACE (A) 04/03/2022 12:45 PM    Glucose Negative 04/03/2022 12:45 PM    Ketone Negative 04/03/2022 12:45 PM    Bilirubin Negative 04/03/2022 12:45 PM    Blood LARGE (A) 04/03/2022 12:45 PM    Urobilinogen 0.2 04/03/2022 12:45 PM    Nitrites Positive (A) 04/03/2022 12:45 PM    Leukocyte Esterase LARGE (A) 04/03/2022 12:45 PM    WBC 0-3 04/03/2022 12:45 PM    RBC 20-50 04/03/2022 12:45 PM    Epithelial cells 0-3 04/03/2022 12:45 PM    Bacteria TRACE 04/03/2022 12:45 PM    Casts 0 04/03/2022 12:45 PM    Crystals, urine 0 04/03/2022 12:45 PM    Mucus 1+ (H) 04/03/2022 12:45 PM          All Labs from Last 24 Hrs:  Recent Results (from the past 24 hour(s))   PLEASE READ & DOCUMENT PPD TEST IN 24 HRS    Collection Time: 04/04/22  4:13 PM   Result Value Ref Range    PPD Negative Negative    mm Induration 0 0 - 5 mm   METABOLIC PANEL, BASIC    Collection Time: 04/05/22  6:07 AM   Result Value Ref Range    Sodium 147 (H) 136 - 145 mmol/L    Potassium 3.8 3.5 - 5.1 mmol/L    Chloride 114 (H) 98 - 107 mmol/L    CO2 27 21 - 32 mmol/L    Anion gap 6 (L) 7 - 16 mmol/L    Glucose 163 (H) 65 - 100 mg/dL    BUN 40 (H) 8 - 23 MG/DL    Creatinine 1.30 0.8 - 1.5 MG/DL    GFR est AA >60 >60 ml/min/1.73m2    GFR est non-AA 55 (L) >60 ml/min/1.73m2    Calcium 8.7 8.3 - 10.4 MG/DL   VANCOMYCIN, RANDOM    Collection Time: 04/05/22  6:07 AM   Result Value Ref Range    Vancomycin, random 7.0 UG/ML       Current Med List in Hospital:   Current Facility-Administered Medications   Medication Dose Route Frequency    amoxicillin (AMOXIL) capsule 500 mg  500 mg Oral Q8H    dextrose 5% infusion  75 mL/hr IntraVENous CONTINUOUS    heparin (porcine) injection 5,000 Units  5,000 Units SubCUTAneous Q12H    pantoprazole (PROTONIX) tablet 40 mg  40 mg Oral ACB    thiamine HCL (B-1) tablet 100 mg  100 mg Oral DAILY    sodium chloride (NS) flush 5-10 mL  5-10 mL IntraVENous Q8H    sodium chloride (NS) flush 5-10 mL  5-10 mL IntraVENous PRN    doxazosin (CARDURA) tablet 4 mg  4 mg Oral QHS    finasteride (PROSCAR) tablet 5 mg  5 mg Oral DAILY    sodium chloride (NS) flush 5-40 mL  5-40 mL IntraVENous Q8H    sodium chloride (NS) flush 5-40 mL  5-40 mL IntraVENous PRN    acetaminophen (TYLENOL) tablet 650 mg  650 mg Oral Q6H PRN    Or    acetaminophen (TYLENOL) suppository 650 mg  650 mg Rectal Q6H PRN    senna (SENOKOT) tablet 17.2 mg  2 Tablet Oral BID PRN    ondansetron (ZOFRAN) injection 4 mg  4 mg IntraVENous Q6H PRN       Allergies   Allergen Reactions    Chocolate Flavor [Flavoring Agent] Rash     Immunization History   Administered Date(s) Administered    TB Skin Test (PPD) Intradermal 04/03/2022    TD Vaccine 03/29/2010       Recent Vital Data:  Patient Vitals for the past 24 hrs:   Temp Pulse Resp BP SpO2   04/05/22 1052 98.4 °F (36.9 °C) 82 18 (!) 98/57 94 %   04/05/22 0736 98.9 °F (37.2 °C) 87 16 (!) 94/59 95 %   04/05/22 0532 98.3 °F (36.8 °C) 87 16 (!) 90/55 96 %   04/05/22 0007 99.2 °F (37.3 °C) 98 18 117/70 94 %   04/04/22 1920 98.1 °F (36.7 °C) 81 16 100/72    04/04/22 1738 97.8 °F (36.6 °C) 82 18 (!) 98/52 100 %   04/04/22 1356 98 °F (36.7 °C)  18 101/61 100 %   04/04/22 1323  75 18 (!) 103/57 100 %   04/04/22 1313  73 18 (!) 98/58 96 %   04/04/22 1303  69 16 (!) 93/54 97 %     Oxygen Therapy  O2 Sat (%): 94 % (04/05/22 1052)  Pulse via Oximetry: 76 beats per minute (04/04/22 1323)  O2 Device: None (Room air) (04/05/22 0759)  O2 Flow Rate (L/min): 3 l/min (04/04/22 1303)    Estimated body mass index is 23.25 kg/m² as calculated from the following:    Height as of this encounter: 5' 8\" (1.727 m). Weight as of this encounter: 69.4 kg (152 lb 14.4 oz). Intake/Output Summary (Last 24 hours) at 4/5/2022 1256  Last data filed at 4/5/2022 1150  Gross per 24 hour   Intake 720 ml   Output 1070 ml   Net -350 ml         Physical Exam:    General:    No overt distress  Head:  Normocephalic, atraumatic  Eyes:  Sclerae appear normal.  Pupils equally round. HENT:  Nares appear normal, no drainage. Moist mucous membranes  Neck:  No restricted ROM. Trachea midline  CV:   RRR. No m/r/g. No JVD  Lungs:   CTAB. No wheezing, rhonchi, or rales. Even, unlabored  Abdomen:   Soft, nontender, nondistended. Extremities: Warm and dry. No cyanosis or clubbing. No edema. Skin:     No rashes. Normal coloration  Neuro:  CN II-XII grossly intact. Psych:  Normal mood and affect. Signed:  Agustin Bangura MD    Part of this note may have been written by using a voice dictation software. The note has been proof read but may still contain some grammatical/other typographical errors.

## 2022-04-05 NOTE — ANESTHESIA POSTPROCEDURE EVALUATION
Procedure(s):  ESOPHAGOGASTRODUODENOSCOPY (EGD) *Rm 610 Transport at 1130 Sodium recheck  ESOPHAGEAL DILATION.     total IV anesthesia    Anesthesia Post Evaluation      Multimodal analgesia: multimodal analgesia used between 6 hours prior to anesthesia start to PACU discharge  Patient location during evaluation: bedside  Patient participation: complete - patient participated  Level of consciousness: awake and alert  Pain management: adequate  Airway patency: patent  Anesthetic complications: no  Cardiovascular status: acceptable  Respiratory status: acceptable  Hydration status: acceptable  Post anesthesia nausea and vomiting:  controlled  Final Post Anesthesia Temperature Assessment:  Normothermia (36.0-37.5 degrees C)      INITIAL Post-op Vital signs:   Vitals Value Taken Time   BP 94/59 04/05/22 0736   Temp 37.2 °C (98.9 °F) 04/05/22 0736   Pulse 87 04/05/22 0736   Resp 16 04/05/22 0736   SpO2 95 % 04/05/22 0736

## 2022-04-05 NOTE — PROGRESS NOTES
Problem: Pressure Injury - Risk of  Goal: *Prevention of pressure injury  Description: Document Gerald Scale and appropriate interventions in the flowsheet. 4/5/2022 1338 by Kye Mendiola RN  Outcome: Resolved/Met  Note: Pressure Injury Interventions:  Sensory Interventions: Assess changes in LOC,Check visual cues for pain,Minimize linen layers    Moisture Interventions: Absorbent underpads,Apply protective barrier, creams and emollients    Activity Interventions: Assess need for specialty bed    Mobility Interventions: Assess need for specialty bed    Nutrition Interventions: Document food/fluid/supplement intake    Friction and Shear Interventions: Apply protective barrier, creams and emollients,Foam dressings/transparent film/skin sealants,HOB 30 degrees or less,Lift sheet             4/5/2022 1040 by Kye Mendiola RN  Outcome: Progressing Towards Goal  Note: Pressure Injury Interventions:  Sensory Interventions: Assess changes in LOC,Check visual cues for pain,Minimize linen layers    Moisture Interventions: Absorbent underpads,Apply protective barrier, creams and emollients    Activity Interventions: Assess need for specialty bed    Mobility Interventions: Assess need for specialty bed    Nutrition Interventions: Document food/fluid/supplement intake    Friction and Shear Interventions: Apply protective barrier, creams and emollients,Foam dressings/transparent film/skin sealants,HOB 30 degrees or less,Lift sheet                Problem: Patient Education: Go to Patient Education Activity  Goal: Patient/Family Education  4/5/2022 1338 by Kye Mendiola RN  Outcome: Resolved/Met  4/5/2022 1040 by Kye Mendiola RN  Outcome: Progressing Towards Goal     Problem: Falls - Risk of  Goal: *Absence of Falls  Description: Document Reddy Fall Risk and appropriate interventions in the flowsheet.   4/5/2022 1338 by Kye Mendiola RN  Outcome: Resolved/Met  Note: Fall Risk Interventions:  Mobility Interventions: Bed/chair exit alarm    Mentation Interventions: Adequate sleep, hydration, pain control    Medication Interventions: Bed/chair exit alarm    Elimination Interventions: Bed/chair exit alarm    History of Falls Interventions: Bed/chair exit alarm      4/5/2022 1040 by Pili Franco RN  Outcome: Progressing Towards Goal  Note: Fall Risk Interventions:  Mobility Interventions: Bed/chair exit alarm    Mentation Interventions: Adequate sleep, hydration, pain control    Medication Interventions: Bed/chair exit alarm    Elimination Interventions: Bed/chair exit alarm    History of Falls Interventions: Bed/chair exit alarm         Problem: Patient Education: Go to Patient Education Activity  Goal: Patient/Family Education  4/5/2022 1338 by Pili Franco RN  Outcome: Resolved/Met  4/5/2022 1040 by Pili Franco RN  Outcome: Progressing Towards Goal     Problem: Patient Education: Go to Patient Education Activity  Goal: Patient/Family Education  4/5/2022 1338 by Pili Franco RN  Outcome: Resolved/Met  4/5/2022 1040 by Pili Franco RN  Outcome: Progressing Towards Goal     Problem: Patient Education: Go to Patient Education Activity  Goal: Patient/Family Education  4/5/2022 1338 by Pili Franco RN  Outcome: Resolved/Met  4/5/2022 1040 by Pili Franco RN  Outcome: Progressing Towards Goal

## 2022-04-05 NOTE — PROGRESS NOTES
[facility] Progress Note      Admit Date: 4/3/2022    CC: Dysphagia    Subjective:     Patient feeling well. Denies pain. Tolerating full liquids per family. No N/V. Medications:  Current Facility-Administered Medications   Medication Dose Route Frequency    dextrose 5% infusion  75 mL/hr IntraVENous CONTINUOUS    heparin (porcine) injection 5,000 Units  5,000 Units SubCUTAneous Q12H    pantoprazole (PROTONIX) tablet 40 mg  40 mg Oral ACB    Vancomycin Intermittent Dosing Per Pharmacy   Other Rx Dosing/Monitoring    thiamine HCL (B-1) tablet 100 mg  100 mg Oral DAILY    sodium chloride (NS) flush 5-10 mL  5-10 mL IntraVENous Q8H    sodium chloride (NS) flush 5-10 mL  5-10 mL IntraVENous PRN    doxazosin (CARDURA) tablet 4 mg  4 mg Oral QHS    finasteride (PROSCAR) tablet 5 mg  5 mg Oral DAILY    sodium chloride (NS) flush 5-40 mL  5-40 mL IntraVENous Q8H    sodium chloride (NS) flush 5-40 mL  5-40 mL IntraVENous PRN    acetaminophen (TYLENOL) tablet 650 mg  650 mg Oral Q6H PRN    Or    acetaminophen (TYLENOL) suppository 650 mg  650 mg Rectal Q6H PRN    senna (SENOKOT) tablet 17.2 mg  2 Tablet Oral BID PRN    ondansetron (ZOFRAN) injection 4 mg  4 mg IntraVENous Q6H PRN       ROS: Otherwise negative in 10 systems except as noted above in Subjective. Objective:   Vitals:  Visit Vitals  BP (!) 94/59 (BP 1 Location: Left upper arm, BP Patient Position: Supine)   Pulse 87   Temp 98.9 °F (37.2 °C)   Resp 16   Ht 5' 8\" (1.727 m)   Wt 69.4 kg (152 lb 14.4 oz)   SpO2 95%   BMI 23.25 kg/m²       Intake/Output:  No intake/output data recorded. 04/03 1901 - 04/05 0700  In: 530 [P.O.:480; I.V.:50]  Out: 1570 [Urine:1570]    Exam:  General appearance: alert, no distress, non-verbal, cachectic   Lungs: clear to auscultation bilaterally  Heart: regular rate and rhythm  Abdomen: soft, non-distended, non-tender.  Bowel sounds normal. No masses,  no organomegaly  Extremities: extremities normal, no cyanosis or edema    Data Review (Labs):    Recent Results (from the past 24 hour(s))   SODIUM    Collection Time: 04/04/22 10:40 AM   Result Value Ref Range    Sodium 151 (H) 138 - 145 mmol/L   PLEASE READ & DOCUMENT PPD TEST IN 24 HRS    Collection Time: 04/04/22  4:13 PM   Result Value Ref Range    PPD Negative Negative    mm Induration 0 0 - 5 mm   METABOLIC PANEL, BASIC    Collection Time: 04/05/22  6:07 AM   Result Value Ref Range    Sodium 147 (H) 136 - 145 mmol/L    Potassium 3.8 3.5 - 5.1 mmol/L    Chloride 114 (H) 98 - 107 mmol/L    CO2 27 21 - 32 mmol/L    Anion gap 6 (L) 7 - 16 mmol/L    Glucose 163 (H) 65 - 100 mg/dL    BUN 40 (H) 8 - 23 MG/DL    Creatinine 1.30 0.8 - 1.5 MG/DL    GFR est AA >60 >60 ml/min/1.73m2    GFR est non-AA 55 (L) >60 ml/min/1.73m2    Calcium 8.7 8.3 - 10.4 MG/DL   VANCOMYCIN, RANDOM    Collection Time: 04/05/22  6:07 AM   Result Value Ref Range    Vancomycin, random 7.0 UG/ML       Assessment:     Principal Problem:    CAET (acute kidney injury) ([address] (4/3/2022)    Active Problems:    UTI (urinary tract infection) (4/4/2022)      Hypotension (4/4/2022)      Hypernatremia (4/4/2022)      Esophageal stricture (4/4/2022)        Plan:     1. Advance diet to soft  2. Cont protonix for esophagitis  3. Avoid NSAIDs  4. Careful mastication  5. Boost/ ensure to supplement calories as needed  6. Planning home w/ hospice  7. Will sign off Pls call if further issues arise. JERONIMO GI PRN.      uDncan Jackson MD  [facility]

## 2022-04-05 NOTE — PROGRESS NOTES
Hourly rounds completed this shift. All needs met at this time. Bed low/locked. Call light within reach. Will continue to monitor and give bedside report to oncoming nurse. Per ashley, pt drank 2 ensures and a mtn dew.

## 2022-04-05 NOTE — DISCHARGE INSTRUCTIONS
Patient Education        Electrolyte Imbalance: Care Instructions  Your Care Instructions  Electrolytes are minerals in your blood. They include sodium, potassium, calcium, and magnesium. When they are not at the right levels, you can feel very ill. You may not know what is causing it, but you know something is wrong. You may feel weak or numb, have muscle spasms, or twitch. Your heart may beat fast. Symptoms are different with each mineral. Too much is as bad as too little. Minerals help keep your body working as it should. Vomiting, diarrhea, and fever can cause you to lose minerals. A problem with your kidneys can tip a mineral out of balance. So can taking certain medicines. Your doctor may do more tests. He or she may change your medicine and diet. If you are low in one or more minerals, they may be given through a tube into your vein (IV). Your doctor may have you take or drink special fluids or pills. If your kidneys are failing, your blood may be filtered. This is called dialysis. It can put the minerals back in balance. Follow-up care is a key part of your treatment and safety. Be sure to make and go to all appointments, and call your doctor if you are having problems. It's also a good idea to know your test results and keep a list of the medicines you take. How can you care for yourself at home? · Take your medicines exactly as prescribed. Call your doctor if you have any problems with your medicines. You will get more details on the specific medicines your doctor prescribes. · Do not take any medicine without talking to your doctor first. This includes prescription, over-the-counter, and herbal medicines. · If you have kidney, heart, or liver disease and have to limit fluids, talk with your doctor before you increase the amount of fluids you drink. · Your doctor or dietitian may give you a diet plan to help balance your minerals. Follow the diet carefully. When should you call for help?    Call 911 anytime you think you may need emergency care. For example, call if:    · You passed out (lost consciousness).     · Your heartbeat seems to be irregular. It might be speeding up and then slowing down or skipping beats. Call your doctor now or seek immediate medical care if:    · You have muscle aches.     · You feel very weak.     · You are confused or cannot think clearly. Watch closely for changes in your health, and be sure to contact your doctor if:    · You do not get better as expected. Where can you learn more? Go to http://www.gray.com/  Enter P525 in the search box to learn more about \"Electrolyte Imbalance: Care Instructions. \"  Current as of: September 8, 2021               Content Version: 13.2  © 2006-2022 [employer]. Care instructions adapted under license by [employer] (which disclaims liability or warranty for this information). If you have questions about a medical condition or this instruction, always ask your healthcare professional. [place] [address] any warranty or liability for your use of this information. Follow up with Primary Care Physician in 3-5 days.

## 2022-04-05 NOTE — PROGRESS NOTES
Per discussion with Mr. Irene Finnegan via cell [phone], agreed to transfer Mr. Hardy of room 610 home with [facility] with 3pm [facility] ambulance this afternoon. Updated Ms. Shaver Pan at [facility] ([phone]) and faxed orders, face sheet, and clinicals to their fax [phone]. Addendum:  Faxed discharge summary to [facility].       Care Management Interventions  Mode of Transport at Discharge: BLS  Transition of Care Consult (CM Consult): Home Hospice ([facility], per family request )  [facility]: No  Support Systems: Child(les),Other Family Member(s)  The Plan for Transition of Care is Related to the Following Treatment Goals : when stable, home with home hospice  Discharge Location  Patient Expects to be Discharged to[de-identified] Home with hospice

## 2022-04-05 NOTE — WOUND CARE
Patient has multiple shallow open wounds irregular shape, recocmmend zinc based barrier cream bid and prn Noted plans to go home on hospice, would continue cream at home as well for comfort.

## 2022-04-05 NOTE — PROGRESS NOTES
Problem: Pressure Injury - Risk of  Goal: *Prevention of pressure injury  Description: Document Gerald Scale and appropriate interventions in the flowsheet. Outcome: Progressing Towards Goal  Note: Pressure Injury Interventions:  Sensory Interventions: Assess changes in LOC,Check visual cues for pain,Minimize linen layers    Moisture Interventions: Absorbent underpads,Apply protective barrier, creams and emollients,Check for incontinence Q2 hours and as needed,Internal/External urinary devices,Limit adult briefs,Maintain skin hydration (lotion/cream),Minimize layers,Moisture barrier    Activity Interventions: Assess need for specialty bed,Increase time out of bed,Pressure redistribution bed/mattress(bed type),PT/OT evaluation    Mobility Interventions: Assess need for specialty bed,Float heels,HOB 30 degrees or less,Pressure redistribution bed/mattress (bed type),PT/OT evaluation,Turn and reposition approx. every two hours(pillow and wedges)    Nutrition Interventions: Document food/fluid/supplement intake,Offer support with meals,snacks and hydration,Discuss nutritional consult with provider    Friction and Shear Interventions: Apply protective barrier, creams and emollients,Foam dressings/transparent film/skin sealants,HOB 30 degrees or less,Lift sheet,Lift team/patient mobility team,Minimize layers                Problem: Patient Education: Go to Patient Education Activity  Goal: Patient/Family Education  Outcome: Progressing Towards Goal     Problem: Falls - Risk of  Goal: *Absence of Falls  Description: Document Reddy Fall Risk and appropriate interventions in the flowsheet.   Outcome: Progressing Towards Goal  Note: Fall Risk Interventions:  Mobility Interventions: Bed/chair exit alarm,Communicate number of staff needed for ambulation/transfer,OT consult for ADLs,Patient to call before getting OOB,PT Consult for mobility concerns,PT Consult for assist device competence,Utilize walker, cane, or other assistive device    Mentation Interventions: Adequate sleep, hydration, pain control,Bed/chair exit alarm,Door open when patient unattended,Evaluate medications/consider consulting pharmacy,Family/sitter at [facility] frequent rounding,Reorient patient,Toileting rounds,Update white board    Medication Interventions: Bed/chair exit alarm,Evaluate medications/consider consulting pharmacy,Patient to call before getting OOB,Teach patient to arise slowly    Elimination Interventions: Bed/chair exit alarm,Call light in reach,Patient to call for help with toileting needs,Stay With Me (per policy),Toilet paper/wipes in reach,Toileting schedule/hourly rounds    History of Falls Interventions: Bed/chair exit alarm,Consult care management for discharge planning,Door open when patient unattended,Evaluate medications/consider consulting pharmacy         Problem: Patient Education: Go to Patient Education Activity  Goal: Patient/Family Education  Outcome: Progressing Towards Goal     Problem: Patient Education: Go to Patient Education Activity  Goal: Patient/Family Education  Outcome: Progressing Towards Goal     Problem: Patient Education: Go to Patient Education Activity  Goal: Patient/Family Education  Outcome: Progressing Towards Goal

## 2022-04-05 NOTE — PROGRESS NOTES
Discharge instructions and education given to patient and family. Questions asked and answered and family verbalizes understanding. Medication changes and AVS reviewed, son, POA  signed copy and placed in chart. [facility].

## 2022-04-05 NOTE — PROGRESS NOTES
Problem: Pressure Injury - Risk of  Goal: *Prevention of pressure injury  Description: Document Gerald Scale and appropriate interventions in the flowsheet. Outcome: Progressing Towards Goal  Note: Pressure Injury Interventions:  Sensory Interventions: Assess changes in LOC,Check visual cues for pain,Minimize linen layers    Moisture Interventions: Absorbent underpads,Apply protective barrier, creams and emollients    Activity Interventions: Assess need for specialty bed    Mobility Interventions: Assess need for specialty bed    Nutrition Interventions: Document food/fluid/supplement intake    Friction and Shear Interventions: Apply protective barrier, creams and emollients,Foam dressings/transparent film/skin sealants,HOB 30 degrees or less,Lift sheet                Problem: Patient Education: Go to Patient Education Activity  Goal: Patient/Family Education  Outcome: Progressing Towards Goal     Problem: Falls - Risk of  Goal: *Absence of Falls  Description: Document Reddy Fall Risk and appropriate interventions in the flowsheet.   Outcome: Progressing Towards Goal  Note: Fall Risk Interventions:  Mobility Interventions: Bed/chair exit alarm    Mentation Interventions: Adequate sleep, hydration, pain control    Medication Interventions: Bed/chair exit alarm    Elimination Interventions: Bed/chair exit alarm    History of Falls Interventions: Bed/chair exit alarm         Problem: Patient Education: Go to Patient Education Activity  Goal: Patient/Family Education  Outcome: Progressing Towards Goal     Problem: Patient Education: Go to Patient Education Activity  Goal: Patient/Family Education  Outcome: Progressing Towards Goal     Problem: Patient Education: Go to Patient Education Activity  Goal: Patient/Family Education  Outcome: Progressing Towards Goal

## 2022-04-07 LAB
BACTERIA SPEC CULT: ABNORMAL
BACTERIA SPEC CULT: ABNORMAL
GRAM STN SPEC: ABNORMAL
SERVICE CMNT-IMP: ABNORMAL

## 2022-04-08 LAB
BACTERIA SPEC CULT: NORMAL
SERVICE CMNT-IMP: NORMAL

## 2025-04-07 NOTE — PROGRESS NOTES
Called pharmacy, under Maggy's name due to last appt with her and pt has an upcoming appt with her.    Physician Progress Note      Lexy Ocampo  CSN #:                  [id number]  :                       [id number]  ADMIT DATE:       4/3/2022 10:35 AM  DISCH DATE:        2022 3:23 PM  RESPONDING  PROVIDER #:        Delano TRINH MD          QUERY TEXT:    Pt admitted with CATE. Noted documentation of severe malnutrition on  from nutrition consult note. If possible, please document in progress notes and discharge summary:    The medical record reflects the following:  Risk Factors: Age, Chronic illness, Altered GI function  Clinical Indicators:  RD note, \"Severe malnutrition,\" \"Severe body fat loss\"  Treatment: EGD, Nutrition consult, Diet supplements,  Options provided:  -- Severe malnutrition confirmed present on admission  -- Defer to RD consultant documentation regarding severe malnutrition  -- Other - I will add my own diagnosis  -- Disagree - Not applicable / Not valid  -- Disagree - Clinically unable to determine / Unknown  -- Refer to Clinical Documentation Reviewer    PROVIDER RESPONSE TEXT:    The diagnosis of severe malnutrition was confirmed as present on admission. Query created by:  Clemente Crane on 2022 10:02 AM      Electronically signed by:  Delano Schulz MD 4/10/2022 3:55 PM

## (undated) DEVICE — CONTAINER PREFIL FRMLN 40ML --

## (undated) DEVICE — CANNULA NSL ORAL AD FOR CAPNOFLEX CO2 O2 AIRLFE

## (undated) DEVICE — VALVE SUCTION AIR H2O SET ORCA POD + DISP

## (undated) DEVICE — CONNECTOR TBNG OD5-7MM O2 END DISP

## (undated) DEVICE — CANNULA NSL AD CO2 SAMP FOR DASH 3000 4000 MON LO FLO

## (undated) DEVICE — TUBING, SUCTION, 3/16" X 6', STRAIGHT: Brand: MEDLINE

## (undated) DEVICE — GAUZE,SPONGE,4"X4",12PLY,WOVEN,NS,LF: Brand: MEDLINE

## (undated) DEVICE — ESOPHAGEAL BALLOON DILATATION CATHETER: Brand: CRE FIXED WIRE

## (undated) DEVICE — FORCEPS BX L240CM JAW DIA2.8MM L CAP W/ NDL MIC MESH TOOTH

## (undated) DEVICE — BW-412T DISP COMBO CLEANING BRUSH: Brand: SINGLE USE COMBINATION CLEANING BRUSH

## (undated) DEVICE — KENDALL RADIOLUCENT FOAM MONITORING ELECTRODE RECTANGULAR SHAPE: Brand: KENDALL

## (undated) DEVICE — BLOCK BITE AD 60FR W/ VELC STRP ADDRESSES MOST PT AND

## (undated) DEVICE — AIR/WATER CLEANING ADAPTER FOR OLYMPUS® GI ENDOSCOPE: Brand: BULLDOG®

## (undated) DEVICE — Device

## (undated) DEVICE — BLOCK BITE 60FR W/ DENT RIM SCRIP ONLY

## (undated) DEVICE — ESOPHAGEAL/PYLORIC/COLONIC/BILIARY WIREGUIDED BALLOON DILATATION CATHETER: Brand: CRE™ PRO